# Patient Record
Sex: MALE | Race: WHITE | NOT HISPANIC OR LATINO | Employment: FULL TIME | URBAN - METROPOLITAN AREA
[De-identification: names, ages, dates, MRNs, and addresses within clinical notes are randomized per-mention and may not be internally consistent; named-entity substitution may affect disease eponyms.]

---

## 2019-09-09 ENCOUNTER — APPOINTMENT (OUTPATIENT)
Dept: GENERAL RADIOLOGY | Facility: HOSPITAL | Age: 34
End: 2019-09-09

## 2019-09-09 ENCOUNTER — HOSPITAL ENCOUNTER (EMERGENCY)
Facility: HOSPITAL | Age: 34
Discharge: HOME | End: 2019-09-09
Admitting: EMERGENCY MEDICINE

## 2019-09-09 VITALS
SYSTOLIC BLOOD PRESSURE: 128 MMHG | HEART RATE: 68 BPM | HEIGHT: 69 IN | OXYGEN SATURATION: 99 % | WEIGHT: 244.71 LBS | RESPIRATION RATE: 17 BRPM | TEMPERATURE: 97.5 F | BODY MASS INDEX: 36.24 KG/M2 | DIASTOLIC BLOOD PRESSURE: 79 MMHG

## 2019-09-09 DIAGNOSIS — M54.41 ACUTE RIGHT-SIDED LOW BACK PAIN WITH RIGHT-SIDED SCIATICA: Primary | ICD-10-CM

## 2019-09-09 PROCEDURE — 96372 THER/PROPH/DIAG INJ SC/IM: CPT

## 2019-09-09 PROCEDURE — 99283 EMERGENCY DEPT VISIT LOW MDM: CPT

## 2019-09-09 PROCEDURE — 25010000002 MORPHINE PER 10 MG: Performed by: NURSE PRACTITIONER

## 2019-09-09 PROCEDURE — 72110 X-RAY EXAM L-2 SPINE 4/>VWS: CPT

## 2019-09-09 RX ORDER — ONDANSETRON 4 MG/1
4 TABLET, ORALLY DISINTEGRATING ORAL ONCE
Status: COMPLETED | OUTPATIENT
Start: 2019-09-09 | End: 2019-09-09

## 2019-09-09 RX ORDER — HYDROCODONE BITARTRATE AND ACETAMINOPHEN 5; 325 MG/1; MG/1
1 TABLET ORAL ONCE AS NEEDED
Status: DISCONTINUED | OUTPATIENT
Start: 2019-09-09 | End: 2019-09-09 | Stop reason: HOSPADM

## 2019-09-09 RX ORDER — DICLOFENAC SODIUM 75 MG/1
75 TABLET, DELAYED RELEASE ORAL 2 TIMES DAILY PRN
Qty: 20 TABLET | Refills: 0 | Status: SHIPPED | OUTPATIENT
Start: 2019-09-09 | End: 2019-09-13 | Stop reason: HOSPADM

## 2019-09-09 RX ORDER — METHOCARBAMOL 500 MG/1
500 TABLET, FILM COATED ORAL ONCE
Status: COMPLETED | OUTPATIENT
Start: 2019-09-09 | End: 2019-09-09

## 2019-09-09 RX ORDER — MORPHINE SULFATE 4 MG/ML
4 INJECTION, SOLUTION INTRAMUSCULAR; INTRAVENOUS ONCE
Status: COMPLETED | OUTPATIENT
Start: 2019-09-09 | End: 2019-09-09

## 2019-09-09 RX ADMIN — HYDROCODONE BITARTRATE AND ACETAMINOPHEN 1 TABLET: 5; 325 TABLET ORAL at 07:49

## 2019-09-09 RX ADMIN — MORPHINE SULFATE 4 MG: 4 INJECTION INTRAVENOUS at 06:58

## 2019-09-09 RX ADMIN — ONDANSETRON 4 MG: 4 TABLET, ORALLY DISINTEGRATING ORAL at 06:59

## 2019-09-09 RX ADMIN — METHOCARBAMOL 500 MG: 500 TABLET ORAL at 06:58

## 2019-09-09 NOTE — ED PROVIDER NOTES
Subjective   Patient is a 34 y/o male complaining of back pain for one day. He reports waking up with right sided back pain yesterday morning that caused shooting pain down in right leg. He reports the pain is a constant dull pain with intermittent sharp shooting pains down the right side and leg. He verbalizes his pain is currently a 7/10, but is a 10/10 at its worst.  He verbalizes that he has had this back pain before, but it has never been this bad. He reports taking Tylenol and ibuprofen yesterday for his pain with no relief, he has taken nothing today.  He denies injury or trauma.  He denies any other pain at this time, no problems with urination or bowel movements. No numbness or tingling, no saddle anesthesia. No chest pain or shortness of breath. He reports going to the [facility] yesterday for this pain and received a steroid injection, which provided no relief, and a prescription for prednisone pack 10 mg and cyclobenzaprine 5 mg, which he states he has not filled yet. He has no pertinent medical history and reports no regular home medications.            Review of Systems   Constitutional: Negative for chills, fatigue and fever.   HENT: Negative for congestion, tinnitus and trouble swallowing.    Eyes: Negative for photophobia, discharge and redness.   Respiratory: Negative for cough and shortness of breath.    Cardiovascular: Negative for chest pain and palpitations.   Gastrointestinal: Negative for abdominal pain, diarrhea, nausea and vomiting.   Genitourinary: Negative for dysuria, frequency and urgency.   Musculoskeletal: Positive for back pain. Negative for joint swelling and myalgias.   Skin: Negative for rash.   Neurological: Negative for dizziness and headaches.   Psychiatric/Behavioral: Negative for confusion.   All other systems reviewed and are negative.      History reviewed. No pertinent past medical history.    No Known Allergies    History reviewed. No pertinent surgical history.    No  family history on file.    Social History     Socioeconomic History   • Marital status: Single     Spouse name: Not on file   • Number of children: Not on file   • Years of education: Not on file   • Highest education level: Not on file           Objective   Physical Exam   Constitutional: He is oriented to person, place, and time. He appears well-developed and well-nourished.   HENT:   Head: Normocephalic and atraumatic.   Eyes: Conjunctivae and EOM are normal. Pupils are equal, round, and reactive to light.   Neck: Normal range of motion. Neck supple.   Cardiovascular: Normal rate, regular rhythm, normal heart sounds and intact distal pulses.   Pulmonary/Chest: Effort normal and breath sounds normal.   Abdominal: Soft. Bowel sounds are normal.   Musculoskeletal: He exhibits tenderness. He exhibits no edema or deformity.        Lumbar back: He exhibits decreased range of motion, tenderness, pain and spasm. He exhibits no bony tenderness, no edema, no deformity, no laceration and normal pulse.        Back:    No overlying erythema, edema or infection noted.   Neurological: He is alert and oriented to person, place, and time. He has normal strength and normal reflexes. He displays a negative Romberg sign. GCS eye subscore is 4. GCS verbal subscore is 5. GCS motor subscore is 6.   Skin: Skin is warm and dry.   Psychiatric: He has a normal mood and affect. His speech is normal and behavior is normal. Judgment and thought content normal. Cognition and memory are normal.   Vitals reviewed.      Procedures           ED Course    Labs Reviewed - No data to display  Medications   HYDROcodone-acetaminophen (NORCO) 5-325 MG per tablet 1 tablet (1 tablet Oral Given 9/9/19 0749)   methocarbamol (ROBAXIN) tablet 500 mg (500 mg Oral Given 9/9/19 0658)   ondansetron ODT (ZOFRAN-ODT) disintegrating tablet 4 mg (4 mg Oral Given 9/9/19 0659)   Morphine sulfate (PF) injection 4 mg (4 mg Subcutaneous Given 9/9/19 0658)     Xr Spine  Lumbar 4+ View    Result Date: 9/9/2019  1.Mild degenerative disease at L5-S1. 2.No acute fracture or subluxation.  Electronically Signed By-Kennedy Dalton On:9/9/2019 7:24 AM This report was finalized on [id number] by  Kennedy Dalton, .        ED Course as of Sep 09 0755   Mon Sep 09, 2019   0729 On evaluation, patient reports a mild decrease in pain from 7/10 to 6/10, but still complains of constant dull pain. Will give Norco 5 mg prior to discharge.  [KW]      ED Course User Index  [KW] Marija Stephenson, APRN      Patient was treated with Robaxin Zofran and morphine here in the emergency room.  Lumbar x-ray done shows no acute fracture or abnormality.  Mild degenerative disc disease at L5 S1.  Patient still complained of pain after receiving morphine injection and was given Norco 5 mg on discharge.            MDM    Final diagnoses:   Acute right-sided low back pain with right-sided sciatica              Marija Stephenson, ANGIE  09/09/19 075

## 2019-09-09 NOTE — ED NOTES
Patient states he has had pain on the right side shooting down his back for 1 day. No known trauma or injury to back. Seen yesterday at a clinic and was given a steroid injection and prescription for PO steroids,     Luz Elena Mendosa RN  09/09/19 0682

## 2019-09-09 NOTE — DISCHARGE INSTRUCTIONS
Apply heat and static stretching to the area of discomfort.    Patient to fill and take prednisone and cyclobenzaprine that he was given yesterday at the [facility].  He may also take diclofenac for pain.  Do not take with Aleve Motrin ibuprofen.    Patient to follow up with PCP in 1-3 days for further evaluation. Dr. Crowley is on call .    May return for new or worsening symptoms, increased pain, fever, shortness of breath, or difficulty urinating.

## 2019-09-11 ENCOUNTER — HOSPITAL ENCOUNTER (OUTPATIENT)
Facility: HOSPITAL | Age: 34
Setting detail: OBSERVATION
Discharge: HOME | End: 2019-09-13
Attending: FAMILY MEDICINE | Admitting: FAMILY MEDICINE

## 2019-09-11 PROBLEM — M54.9 INTRACTABLE BACK PAIN: Status: ACTIVE | Noted: 2019-09-11

## 2019-09-11 LAB
ALBUMIN SERPL-MCNC: 3.5 G/DL (ref 3.5–4.8)
ALBUMIN/GLOB SERPL: 1.3 G/DL (ref 1–1.7)
ALP SERPL-CCNC: 45 U/L (ref 32–91)
ALT SERPL W P-5'-P-CCNC: 67 U/L (ref 17–63)
ANION GAP SERPL CALCULATED.3IONS-SCNC: 16.2 MMOL/L (ref 5–15)
AST SERPL-CCNC: 47 U/L (ref 15–41)
BASOPHILS # BLD AUTO: 0 10*3/MM3 (ref 0–0.2)
BASOPHILS NFR BLD AUTO: 0.4 % (ref 0–1.5)
BILIRUB SERPL-MCNC: 0.6 MG/DL (ref 0.3–1.2)
BUN BLD-MCNC: 10 MG/DL (ref 8–20)
BUN/CREAT SERPL: 11.1 (ref 6.2–20.3)
CALCIUM SPEC-SCNC: 8.8 MG/DL (ref 8.9–10.3)
CHLORIDE SERPL-SCNC: 101 MMOL/L (ref 101–111)
CO2 SERPL-SCNC: 26 MMOL/L (ref 22–32)
CREAT BLD-MCNC: 0.9 MG/DL (ref 0.7–1.2)
DEPRECATED RDW RBC AUTO: 44.2 FL (ref 37–54)
EOSINOPHIL # BLD AUTO: 0 10*3/MM3 (ref 0–0.4)
EOSINOPHIL NFR BLD AUTO: 0.2 % (ref 0.3–6.2)
ERYTHROCYTE [DISTWIDTH] IN BLOOD BY AUTOMATED COUNT: 13.7 % (ref 12.3–15.4)
GFR SERPL CREATININE-BSD FRML MDRD: 98 ML/MIN/1.73
GLOBULIN UR ELPH-MCNC: 2.6 GM/DL (ref 2.5–3.8)
GLUCOSE BLD-MCNC: 154 MG/DL (ref 65–99)
HCT VFR BLD AUTO: 44.2 % (ref 37.5–51)
HGB BLD-MCNC: 14.8 G/DL (ref 13–17.7)
LYMPHOCYTES # BLD AUTO: 2.1 10*3/MM3 (ref 0.7–3.1)
LYMPHOCYTES NFR BLD AUTO: 19.9 % (ref 19.6–45.3)
MCH RBC QN AUTO: 30.9 PG (ref 26.6–33)
MCHC RBC AUTO-ENTMCNC: 33.5 G/DL (ref 31.5–35.7)
MCV RBC AUTO: 92.2 FL (ref 79–97)
MONOCYTES # BLD AUTO: 0.4 10*3/MM3 (ref 0.1–0.9)
MONOCYTES NFR BLD AUTO: 4 % (ref 5–12)
NEUTROPHILS # BLD AUTO: 8 10*3/MM3 (ref 1.7–7)
NEUTROPHILS NFR BLD AUTO: 75.5 % (ref 42.7–76)
NRBC BLD AUTO-RTO: 0.1 /100 WBC (ref 0–0.2)
PLATELET # BLD AUTO: 223 10*3/MM3 (ref 140–450)
PMV BLD AUTO: 9.6 FL (ref 6–12)
POTASSIUM BLD-SCNC: 3.2 MMOL/L (ref 3.6–5.1)
PROT SERPL-MCNC: 6.1 G/DL (ref 6.1–7.9)
RBC # BLD AUTO: 4.79 10*6/MM3 (ref 4.14–5.8)
SODIUM BLD-SCNC: 140 MMOL/L (ref 136–144)
WBC NRBC COR # BLD: 10.6 10*3/MM3 (ref 3.4–10.8)

## 2019-09-11 PROCEDURE — 85025 COMPLETE CBC W/AUTO DIFF WBC: CPT | Performed by: FAMILY MEDICINE

## 2019-09-11 PROCEDURE — 25010000002 KETOROLAC TROMETHAMINE PER 15 MG: Performed by: FAMILY MEDICINE

## 2019-09-11 PROCEDURE — 96374 THER/PROPH/DIAG INJ IV PUSH: CPT

## 2019-09-11 PROCEDURE — 25010000002 METHYLPREDNISOLONE PER 125 MG: Performed by: FAMILY MEDICINE

## 2019-09-11 PROCEDURE — G0379 DIRECT REFER HOSPITAL OBSERV: HCPCS

## 2019-09-11 PROCEDURE — G0378 HOSPITAL OBSERVATION PER HR: HCPCS

## 2019-09-11 PROCEDURE — 25010000002 MORPHINE PER 10 MG: Performed by: FAMILY MEDICINE

## 2019-09-11 PROCEDURE — 80053 COMPREHEN METABOLIC PANEL: CPT | Performed by: FAMILY MEDICINE

## 2019-09-11 PROCEDURE — 96375 TX/PRO/DX INJ NEW DRUG ADDON: CPT

## 2019-09-11 RX ORDER — METHYLPREDNISOLONE SODIUM SUCCINATE 125 MG/2ML
80 INJECTION, POWDER, LYOPHILIZED, FOR SOLUTION INTRAMUSCULAR; INTRAVENOUS EVERY 8 HOURS
Status: DISCONTINUED | OUTPATIENT
Start: 2019-09-11 | End: 2019-09-13 | Stop reason: HOSPADM

## 2019-09-11 RX ORDER — KETOROLAC TROMETHAMINE 30 MG/ML
30 INJECTION, SOLUTION INTRAMUSCULAR; INTRAVENOUS EVERY 6 HOURS PRN
Status: DISCONTINUED | OUTPATIENT
Start: 2019-09-11 | End: 2019-09-13 | Stop reason: HOSPADM

## 2019-09-11 RX ORDER — HYDROCODONE BITARTRATE AND ACETAMINOPHEN 10; 325 MG/1; MG/1
1 TABLET ORAL EVERY 4 HOURS PRN
Status: DISCONTINUED | OUTPATIENT
Start: 2019-09-11 | End: 2019-09-13 | Stop reason: HOSPADM

## 2019-09-11 RX ORDER — MORPHINE SULFATE 4 MG/ML
4 INJECTION, SOLUTION INTRAMUSCULAR; INTRAVENOUS
Status: DISCONTINUED | OUTPATIENT
Start: 2019-09-11 | End: 2019-09-13 | Stop reason: HOSPADM

## 2019-09-11 RX ORDER — POTASSIUM CHLORIDE 20 MEQ/1
20 TABLET, EXTENDED RELEASE ORAL ONCE
Status: COMPLETED | OUTPATIENT
Start: 2019-09-11 | End: 2019-09-11

## 2019-09-11 RX ORDER — AMLODIPINE BESYLATE 5 MG/1
5 TABLET ORAL EVERY 12 HOURS
Status: DISCONTINUED | OUTPATIENT
Start: 2019-09-12 | End: 2019-09-13 | Stop reason: HOSPADM

## 2019-09-11 RX ORDER — AMLODIPINE BESYLATE 5 MG/1
5 TABLET ORAL ONCE
Status: COMPLETED | OUTPATIENT
Start: 2019-09-12 | End: 2019-09-11

## 2019-09-11 RX ORDER — ACETAMINOPHEN 325 MG/1
650 TABLET ORAL EVERY 6 HOURS PRN
Status: DISCONTINUED | OUTPATIENT
Start: 2019-09-11 | End: 2019-09-13 | Stop reason: HOSPADM

## 2019-09-11 RX ORDER — CYCLOBENZAPRINE HCL 10 MG
10 TABLET ORAL 3 TIMES DAILY PRN
COMMUNITY
End: 2023-01-20

## 2019-09-11 RX ADMIN — KETOROLAC TROMETHAMINE 30 MG: 30 INJECTION, SOLUTION INTRAMUSCULAR at 23:19

## 2019-09-11 RX ADMIN — METHYLPREDNISOLONE SODIUM SUCCINATE 80 MG: 125 INJECTION, POWDER, FOR SOLUTION INTRAMUSCULAR; INTRAVENOUS at 21:56

## 2019-09-11 RX ADMIN — MORPHINE SULFATE 4 MG: 4 INJECTION INTRAVENOUS at 21:56

## 2019-09-11 RX ADMIN — POTASSIUM CHLORIDE 20 MEQ: 1500 TABLET, EXTENDED RELEASE ORAL at 22:40

## 2019-09-11 RX ADMIN — AMLODIPINE BESYLATE 5 MG: 5 TABLET ORAL at 23:19

## 2019-09-11 RX ADMIN — HYDROCODONE BITARTRATE AND ACETAMINOPHEN 1 TABLET: 10; 325 TABLET ORAL at 23:19

## 2019-09-12 ENCOUNTER — APPOINTMENT (OUTPATIENT)
Dept: MRI IMAGING | Facility: HOSPITAL | Age: 34
End: 2019-09-12

## 2019-09-12 PROBLEM — M51.27 HERNIATION OF INTERVERTEBRAL DISC BETWEEN L5 AND S1: Status: ACTIVE | Noted: 2019-09-12

## 2019-09-12 PROBLEM — T38.0X5A STEROID-INDUCED HYPERGLYCEMIA: Status: ACTIVE | Noted: 2019-09-12

## 2019-09-12 PROBLEM — R73.9 STEROID-INDUCED HYPERGLYCEMIA: Status: ACTIVE | Noted: 2019-09-12

## 2019-09-12 LAB
ALBUMIN SERPL-MCNC: 3.8 G/DL (ref 3.5–4.8)
ALBUMIN/GLOB SERPL: 1.7 G/DL (ref 1–1.7)
ALP SERPL-CCNC: 50 U/L (ref 32–91)
ALT SERPL W P-5'-P-CCNC: 81 U/L (ref 17–63)
ANION GAP SERPL CALCULATED.3IONS-SCNC: 13.6 MMOL/L (ref 5–15)
AST SERPL-CCNC: 52 U/L (ref 15–41)
BASOPHILS # BLD AUTO: 0 10*3/MM3 (ref 0–0.2)
BASOPHILS NFR BLD AUTO: 0.2 % (ref 0–1.5)
BILIRUB SERPL-MCNC: 0.5 MG/DL (ref 0.3–1.2)
BUN BLD-MCNC: 11 MG/DL (ref 8–20)
BUN/CREAT SERPL: 13.8 (ref 6.2–20.3)
CALCIUM SPEC-SCNC: 9.3 MG/DL (ref 8.9–10.3)
CHLORIDE SERPL-SCNC: 103 MMOL/L (ref 101–111)
CO2 SERPL-SCNC: 28 MMOL/L (ref 22–32)
CREAT BLD-MCNC: 0.8 MG/DL (ref 0.7–1.2)
DEPRECATED RDW RBC AUTO: 45.1 FL (ref 37–54)
EOSINOPHIL # BLD AUTO: 0 10*3/MM3 (ref 0–0.4)
EOSINOPHIL NFR BLD AUTO: 0 % (ref 0.3–6.2)
ERYTHROCYTE [DISTWIDTH] IN BLOOD BY AUTOMATED COUNT: 13.7 % (ref 12.3–15.4)
GFR SERPL CREATININE-BSD FRML MDRD: 112 ML/MIN/1.73
GLOBULIN UR ELPH-MCNC: 2.3 GM/DL (ref 2.5–3.8)
GLUCOSE BLD-MCNC: 127 MG/DL (ref 65–99)
GLUCOSE BLDC GLUCOMTR-MCNC: 194 MG/DL (ref 70–105)
HBA1C MFR BLD: 5.2 % (ref 3.5–5.6)
HCT VFR BLD AUTO: 45.2 % (ref 37.5–51)
HGB BLD-MCNC: 15 G/DL (ref 13–17.7)
LYMPHOCYTES # BLD AUTO: 1.2 10*3/MM3 (ref 0.7–3.1)
LYMPHOCYTES NFR BLD AUTO: 11.2 % (ref 19.6–45.3)
MCH RBC QN AUTO: 30.9 PG (ref 26.6–33)
MCHC RBC AUTO-ENTMCNC: 33.2 G/DL (ref 31.5–35.7)
MCV RBC AUTO: 92.9 FL (ref 79–97)
MONOCYTES # BLD AUTO: 0.3 10*3/MM3 (ref 0.1–0.9)
MONOCYTES NFR BLD AUTO: 2.6 % (ref 5–12)
NEUTROPHILS # BLD AUTO: 9.5 10*3/MM3 (ref 1.7–7)
NEUTROPHILS NFR BLD AUTO: 86 % (ref 42.7–76)
NRBC BLD AUTO-RTO: 0.1 /100 WBC (ref 0–0.2)
PLATELET # BLD AUTO: 236 10*3/MM3 (ref 140–450)
PMV BLD AUTO: 9.7 FL (ref 6–12)
POTASSIUM BLD-SCNC: 4.6 MMOL/L (ref 3.6–5.1)
PROT SERPL-MCNC: 6.1 G/DL (ref 6.1–7.9)
RBC # BLD AUTO: 4.87 10*6/MM3 (ref 4.14–5.8)
SODIUM BLD-SCNC: 140 MMOL/L (ref 136–144)
WBC NRBC COR # BLD: 11.1 10*3/MM3 (ref 3.4–10.8)

## 2019-09-12 PROCEDURE — 85025 COMPLETE CBC W/AUTO DIFF WBC: CPT | Performed by: FAMILY MEDICINE

## 2019-09-12 PROCEDURE — 83036 HEMOGLOBIN GLYCOSYLATED A1C: CPT | Performed by: FAMILY MEDICINE

## 2019-09-12 PROCEDURE — 80053 COMPREHEN METABOLIC PANEL: CPT | Performed by: FAMILY MEDICINE

## 2019-09-12 PROCEDURE — G0378 HOSPITAL OBSERVATION PER HR: HCPCS

## 2019-09-12 PROCEDURE — 96376 TX/PRO/DX INJ SAME DRUG ADON: CPT

## 2019-09-12 PROCEDURE — 25010000002 ENOXAPARIN PER 10 MG: Performed by: FAMILY MEDICINE

## 2019-09-12 PROCEDURE — 25010000002 KETOROLAC TROMETHAMINE PER 15 MG: Performed by: FAMILY MEDICINE

## 2019-09-12 PROCEDURE — 25010000002 MORPHINE PER 10 MG: Performed by: FAMILY MEDICINE

## 2019-09-12 PROCEDURE — 96372 THER/PROPH/DIAG INJ SC/IM: CPT

## 2019-09-12 PROCEDURE — 97161 PT EVAL LOW COMPLEX 20 MIN: CPT

## 2019-09-12 PROCEDURE — 82962 GLUCOSE BLOOD TEST: CPT

## 2019-09-12 PROCEDURE — 25010000002 METHYLPREDNISOLONE PER 125 MG: Performed by: FAMILY MEDICINE

## 2019-09-12 PROCEDURE — 72148 MRI LUMBAR SPINE W/O DYE: CPT

## 2019-09-12 RX ORDER — ONDANSETRON 4 MG/1
4 TABLET, FILM COATED ORAL EVERY 4 HOURS PRN
Status: DISCONTINUED | OUTPATIENT
Start: 2019-09-12 | End: 2019-09-13 | Stop reason: HOSPADM

## 2019-09-12 RX ORDER — ALUMINA, MAGNESIA, AND SIMETHICONE 2400; 2400; 240 MG/30ML; MG/30ML; MG/30ML
15 SUSPENSION ORAL EVERY 6 HOURS PRN
Status: DISCONTINUED | OUTPATIENT
Start: 2019-09-12 | End: 2019-09-13 | Stop reason: HOSPADM

## 2019-09-12 RX ORDER — ONDANSETRON 2 MG/ML
4 INJECTION INTRAMUSCULAR; INTRAVENOUS EVERY 4 HOURS PRN
Status: DISCONTINUED | OUTPATIENT
Start: 2019-09-12 | End: 2019-09-13 | Stop reason: HOSPADM

## 2019-09-12 RX ORDER — BISACODYL 10 MG
10 SUPPOSITORY, RECTAL RECTAL DAILY PRN
Status: DISCONTINUED | OUTPATIENT
Start: 2019-09-12 | End: 2019-09-13 | Stop reason: HOSPADM

## 2019-09-12 RX ORDER — SODIUM CHLORIDE 0.9 % (FLUSH) 0.9 %
10 SYRINGE (ML) INJECTION EVERY 12 HOURS SCHEDULED
Status: DISCONTINUED | OUTPATIENT
Start: 2019-09-12 | End: 2019-09-13 | Stop reason: HOSPADM

## 2019-09-12 RX ORDER — DOCUSATE SODIUM 100 MG/1
100 CAPSULE, LIQUID FILLED ORAL DAILY
Status: DISCONTINUED | OUTPATIENT
Start: 2019-09-13 | End: 2019-09-13 | Stop reason: HOSPADM

## 2019-09-12 RX ORDER — SODIUM CHLORIDE 0.9 % (FLUSH) 0.9 %
10 SYRINGE (ML) INJECTION AS NEEDED
Status: DISCONTINUED | OUTPATIENT
Start: 2019-09-12 | End: 2019-09-13 | Stop reason: HOSPADM

## 2019-09-12 RX ADMIN — KETOROLAC TROMETHAMINE 30 MG: 30 INJECTION, SOLUTION INTRAMUSCULAR at 19:31

## 2019-09-12 RX ADMIN — ENOXAPARIN SODIUM 40 MG: 40 INJECTION SUBCUTANEOUS at 22:32

## 2019-09-12 RX ADMIN — METHYLPREDNISOLONE SODIUM SUCCINATE 80 MG: 125 INJECTION, POWDER, FOR SOLUTION INTRAMUSCULAR; INTRAVENOUS at 05:16

## 2019-09-12 RX ADMIN — HYDROCODONE BITARTRATE AND ACETAMINOPHEN 1 TABLET: 10; 325 TABLET ORAL at 19:31

## 2019-09-12 RX ADMIN — METHYLPREDNISOLONE SODIUM SUCCINATE 80 MG: 125 INJECTION, POWDER, FOR SOLUTION INTRAMUSCULAR; INTRAVENOUS at 22:31

## 2019-09-12 RX ADMIN — KETOROLAC TROMETHAMINE 30 MG: 30 INJECTION, SOLUTION INTRAMUSCULAR at 11:16

## 2019-09-12 RX ADMIN — MORPHINE SULFATE 4 MG: 4 INJECTION INTRAVENOUS at 05:16

## 2019-09-12 RX ADMIN — HYDROCODONE BITARTRATE AND ACETAMINOPHEN 1 TABLET: 10; 325 TABLET ORAL at 10:04

## 2019-09-12 RX ADMIN — Medication 10 ML: at 21:44

## 2019-09-12 RX ADMIN — MORPHINE SULFATE 4 MG: 4 INJECTION INTRAVENOUS at 22:31

## 2019-09-12 RX ADMIN — METHYLPREDNISOLONE SODIUM SUCCINATE 80 MG: 125 INJECTION, POWDER, FOR SOLUTION INTRAMUSCULAR; INTRAVENOUS at 13:16

## 2019-09-12 NOTE — PLAN OF CARE
Problem: Patient Care Overview  Goal: Plan of Care Review  Outcome: Outcome(s) achieved Date Met: 09/12/19 09/12/19 0911   Coping/Psychosocial   Plan of Care Reviewed With patient   Plan of Care Review   Progress improving   OTHER   Outcome Summary Pt presents with complaints of right low back pain with RLE radicular signs/symptoms of progressive worsening over the last several days. Pt reports previous hx of similar episodes that resolved within 1-2 days. Pt c/o pain 5/10 this date but demonstrates MOD I with all mobility with no AD. Pt demonstrates antalgic gait with decreased stance RLE and with reports of RLE numbness/tingling distal to knee, however, with no LOB during mobility and anticipate safe return home with assist from fiance as needed. No skilled acute care physical therapy needs at this time, however, recommending OP PT for treatment of low back pain/radicular signs/symptoms. Pt will be d/c'd from PT services at [facility].

## 2019-09-12 NOTE — THERAPY EVALUATION
Patient Name: Bo Kay  : 1985    MRN: [id number]                              Today's Date: 2019       Admit Date: 2019    Visit Dx: No diagnosis found.  Patient Active Problem List   Diagnosis   • Intractable back pain     No past medical history on file.  No past surgical history on file.  General Information     Row Name 19 0843          PT Evaluation Time/Intention    Document Type  evaluation  -AO     Mode of Treatment  physical therapy  -AO     Row Name 19 0843          General Information    Patient Profile Reviewed?  yes  -AO     Prior Level of Function  independent: Working full-time in construction; independent all ADLs and driving, etc.  -AO     Existing Precautions/Restrictions  no known precautions/restrictions  -AO     Barriers to Rehab  none identified  -AO     Row Name 19 0843          Relationship/Environment    Lives With  -- Lives w/ fiance who is home during the day and can provide assist as needed  -AO     Row Name 19 0843          Resource/Environmental Concerns    Current Living Arrangements  home/apartment/condo  -AO     Row Name 19 0843          Home Main Entrance    Number of Stairs, Main Entrance  four  -AO     Row Name 19 0843          Stairs Within Home, Primary    Number of Stairs, Within Home, Primary  none  -AO     Row Name 19 0843          Cognitive Assessment/Intervention- PT/OT    Orientation Status (Cognition)  oriented x 4  -AO       User Key  (r) = Recorded By, (t) = Taken By, (c) = Cosigned By    Initials Name Provider Type    AO Christina Ryan, PT Physical Therapist        Mobility     Row Name 19 0843          Bed Mobility Assessment/Treatment    Bed Mobility Assessment/Treatment  supine-sit-supine  -AO     Supine-Sit-Supine [place] (Bed Mobility)  conditional independence  -AO     Row Name 19 0843          Sit-Stand Transfer    Sit-Stand [place] (Transfers)  conditional independence   -AO     Row Name 09/12/19 0843          Gait/Stairs Assessment/Training    Gait/Stairs Assessment/Training  distance ambulated  -AO     [place] Level (Gait)  conditional independence  -AO     Distance in Feet (Gait)  175 ft  -AO     Pattern (Gait)  other (see comments) reciprocal  -AO     Deviations/Abnormal Patterns (Gait)  right sided deviations;antalgic  -AO     Right Sided Gait Deviations  heel strike decreased decreased stance time  -AO       User Key  (r) = Recorded By, (t) = Taken By, (c) = Cosigned By    Initials Name Provider Type    AO Christina Ryan, PT Physical Therapist        Obj/Interventions     Row Name 09/12/19 0843          General ROM    GENERAL ROM COMMENTS  BUE/BLE AROM WFL  -AO     Row Name 09/12/19 0843          MMT (Manual Muscle Testing)    General MMT Comments  BUE/BLEs WFL  -AO     Row Name 09/12/19 0843          Static Sitting Balance    Level of [place] (Unsupported Sitting, Static Balance)  independent  -AO     Time Able to Maintain Position (Unsupported Sitting, Static Balance)  1 to 2 minutes  -AO     Comment (Unsupported Sitting, Static Balance)  Static sitting tolerance limited by increased low back and RLE pain in sitting  -AO     Row Name 09/12/19 0843          Static Standing Balance    Level of [place] (Supported Standing, Static Balance)  independent  -AO     Time Able to Maintain Position (Supported Standing, Static Balance)  1 to 2 minutes  -AO     Row Name 09/12/19 0843          Dynamic Standing Balance    Level of [place], Reaches Outside Midline (Standing, Dynamic Balance)  conditional independence  -AO     Comment, Reaches Outside Midline (Standing, Dynamic Balance)  Able to shanae R sock in standing (RLE propped on chair) but required assist for donning LLE secondary to increased R hip/LE pain with weight shifting onto RLE  -AO     Row Name 09/12/19 0843          Sensory Assessment/Intervention    Sensory General Assessment  -- RLE radiular  "signs/symptoms with impaired sensation distal to R knee  -AO       User Key  (r) = Recorded By, (t) = Taken By, (c) = Cosigned By    Initials Name Provider Type    Christina Olmstead, PT Physical Therapist        Goals/Plan     Row Name 09/12/19 0843          Bed Mobility Goal 1 (PT)    Activity/Assistive Device (Bed Mobility Goal 1, PT)  sit to supine/supine to sit  -AO     [place] Level/Cues Needed (Bed Mobility Goal 1, PT)  conditional independence  -AO     Time Frame (Bed Mobility Goal 1, PT)  2 weeks  -AO     Progress/Outcomes (Bed Mobility Goal 1, PT)  goal met  -AO       User Key  (r) = Recorded By, (t) = Taken By, (c) = Cosigned By    Initials Name Provider Type    Christina Olmstead, PT Physical Therapist        Clinical Impression     Row Name 09/12/19 0843          Pain Assessment    Additional Documentation  Pain Scale: Numbers Pre/Post-Treatment (Group)  -AO     Row Name 09/12/19 0843          Pain Scale: Numbers Pre/Post-Treatment    Pain Scale: Numbers, Pretreatment  5/10  -AO     Pain Scale: Numbers, Post-Treatment  5/10  -AO     Pain Location - Side  Right  -AO     Pain Location - Orientation  lower  -AO     Pain Location  extremity  -AO     Pre/Post Treatment Pain Comment  Pt reports this AM his was having low back/hip spasms with significant radicular symptoms  -AO     Pain Intervention(s)  Medication (See MAR)  -AO     Row Name 09/12/19 0843          Plan of Care Review    Plan of Care Reviewed With  patient  -AO     Row Name 09/12/19 0843          Physical Therapy Clinical Impression    Patient/Family Goals Statement (PT Clinical Impression)  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 Pt presented to [facility] with intractable LBP with RLE radicular signs/symptoms that began ~2 weeks ago but became progessively worse/intolerable Saturday, 09/07/19. MRI results pending. Pt reports previous history of similar LBP and radicular signs and symptoms that \"comes and " "goes\" from time to time, with most recent episode last June, however, pt reports previous episodes lasted only 1-2 days before symptoms would resolve. Pt describes pain as beginning in R low back, down hip/buttocks, and into the R lower extremity.  -AO     Criteria for Skilled Interventions Met (PT Clinical Impression)  other (see comments) Pt with no skilled acute physical therapy needs, however, recommending OP PT for treatment of LBP  -AO     Row Name 09/12/19 0843          Vital Signs    Recovery Time  Vitals WNL on RA.  -AO       User Key  (r) = Recorded By, (t) = Taken By, (c) = Cosigned By    Initials Name Provider Type    AO Christina Ryan, PT Physical Therapist        Outcome Measures    No documentation.       Physical Therapy Education     Title: PT OT SLP Therapies (Done)     Topic: Physical Therapy (Done)     Point: Mobility training (Done)     Learning Progress Summary           Patient Acceptance, E,TB, VU by AO at 9/12/2019  9:09 AM    Comment:  Educated pt on RLE piriformis/low back stretch for improved radicular signs/symptoms. Educated pt on benefits of OP PT for spine treatment and low back pain. Educated pt on POC.                   Point: Body mechanics (Done)     Learning Progress Summary           Patient Acceptance, E,TB, VU by AO at 9/12/2019  9:09 AM    Comment:  Educated pt on RLE piriformis/low back stretch for improved radicular signs/symptoms. Educated pt on benefits of OP PT for spine treatment and low back pain. Educated pt on POC.                   Point: Precautions (Done)     Learning Progress Summary           Patient Acceptance, E,TB, VU by AO at 9/12/2019  9:09 AM    Comment:  Educated pt on RLE piriformis/low back stretch for improved radicular signs/symptoms. Educated pt on benefits of OP PT for spine treatment and low back pain. Educated pt on POC.                               User Key     Initials Effective Dates Name Provider Type Discipline    AO 03/01/19 -  [place]helby" Christina TORRES, PT Physical Therapist PT              PT Recommendation and Plan  Planned Therapy Interventions (PT Eval): balance training, bed mobility training, gait training, patient/family education, transfer training  Outcome Summary/Treatment Plan (PT)  Anticipated Discharge Disposition (PT): home with OP services(assist from fiance as needed)  Plan of Care Reviewed With: patient  Progress: improving  Outcome Summary: Pt presents with complaints of right low back pain with RLE radicular signs/symptoms of progressive worsening over the last several days. Pt reports previous hx of similar episodes that resolved within 1-2 days. Pt c/o pain 5/10 this date but demonstrates MOD I with all mobility with no AD. Pt demonstrates antalgic gait with decreased stance RLE and with reports of RLE numbness/tingling distal to knee, however, with no LOB during mobility and anticipate safe return home with assist from fiance as needed. No skilled acute care physical therapy needs at this time, however, recommending OP PT for treatment of low back pain/radicular signs/symptoms. Pt will be d/c'd [facility] at [facility].     Time Calculation:   PT Charges     Row Name 09/12/19 0843             Time Calculation    Start Time  0843  -AO      Stop Time  0857  -AO      Time Calculation (min)  14 min  -AO      PT Received On  09/12/19  -AO         Time Calculation- PT    Total Timed Code Minutes- PT  0 minute(s)  -AO      TCU Minutes- PT  19 min  -AO        User Key  (r) = Recorded By, (t) = Taken By, (c) = Cosigned By    Initials Name Provider Type    AO Christina Ryan, PT Physical Therapist        Therapy Charges for Today     Code Description Service Date Service Provider Modifiers Qty    77012265628  PT EVAL LOW COMPLEXITY 4 9/12/2019 Christina Ryan, PT GP 1               Christina Ryan PT  9/12/2019

## 2019-09-12 NOTE — PLAN OF CARE
Problem: Patient Care Overview  Goal: Plan of Care Review  Outcome: Ongoing (interventions implemented as appropriate)   09/12/19 0330   Coping/Psychosocial   Plan of Care Reviewed With patient;significant other   Plan of Care Review   Progress improving   OTHER   Outcome Summary Better pain control through the night, awaiting MRI scan     Goal: Individualization and Mutuality  Outcome: Ongoing (interventions implemented as appropriate)   09/12/19 0330   Individualization   Patient Specific Goals (Include Timeframe) pain under control by discharge   Patient Specific Interventions Pain medications as ordered, MRI lumbar spine     Goal: Discharge Needs Assessment  Outcome: Ongoing (interventions implemented as appropriate)   09/12/19 0330   Discharge Needs Assessment   Readmission Within the Last 30 Days no previous admission in last 30 days   Concerns to be Addressed denies needs/concerns at this time   Patient/Family Anticipates Transition to home with family   Patient/Family Anticipated Services at Transition none   Transportation Concerns car, none   Transportation Anticipated family or friend will provide   Anticipated Changes Related to Illness none   Equipment Needed After Discharge none   Disability   Equipment Currently Used at Home none       Problem: Pain, Acute (Adult)  Goal: Identify Related Risk Factors and Signs and Symptoms  Outcome: Outcome(s) achieved Date Met: 09/12/19 09/12/19 0330   Pain, Acute (Adult)   Related Risk Factors (Acute Pain) disease process;positioning;persistent pain   Signs and Symptoms (Acute Pain) facial mask of pain/grimace;guarding/abnormal posturing/positioning;moaning;verbalization of pain descriptors;sleep pattern alteration     Goal: Acceptable Pain Control/Comfort Level  Outcome: Ongoing (interventions implemented as appropriate)   09/12/19 0330   Pain, Acute (Adult)   Acceptable Pain Control/Comfort Level making progress toward outcome

## 2019-09-13 VITALS
WEIGHT: 249.12 LBS | HEIGHT: 69 IN | TEMPERATURE: 97.5 F | HEART RATE: 53 BPM | SYSTOLIC BLOOD PRESSURE: 155 MMHG | RESPIRATION RATE: 12 BRPM | BODY MASS INDEX: 36.9 KG/M2 | DIASTOLIC BLOOD PRESSURE: 82 MMHG | OXYGEN SATURATION: 97 %

## 2019-09-13 LAB
ALBUMIN SERPL-MCNC: 3.3 G/DL (ref 3.5–4.8)
ALBUMIN/GLOB SERPL: 1.4 G/DL (ref 1–1.7)
ALP SERPL-CCNC: 48 U/L (ref 32–91)
ALT SERPL W P-5'-P-CCNC: 63 U/L (ref 17–63)
ANION GAP SERPL CALCULATED.3IONS-SCNC: 12.2 MMOL/L (ref 5–15)
AST SERPL-CCNC: 29 U/L (ref 15–41)
BILIRUB SERPL-MCNC: 0.5 MG/DL (ref 0.3–1.2)
BUN BLD-MCNC: 17 MG/DL (ref 8–20)
BUN/CREAT SERPL: 18.9 (ref 6.2–20.3)
CALCIUM SPEC-SCNC: 9 MG/DL (ref 8.9–10.3)
CHLORIDE SERPL-SCNC: 101 MMOL/L (ref 101–111)
CO2 SERPL-SCNC: 29 MMOL/L (ref 22–32)
CREAT BLD-MCNC: 0.9 MG/DL (ref 0.7–1.2)
DEPRECATED RDW RBC AUTO: 44.2 FL (ref 37–54)
ERYTHROCYTE [DISTWIDTH] IN BLOOD BY AUTOMATED COUNT: 13.8 % (ref 12.3–15.4)
GFR SERPL CREATININE-BSD FRML MDRD: 98 ML/MIN/1.73
GIANT PLATELETS: ABNORMAL
GLOBULIN UR ELPH-MCNC: 2.4 GM/DL (ref 2.5–3.8)
GLUCOSE BLD-MCNC: 164 MG/DL (ref 65–99)
HCT VFR BLD AUTO: 45.2 % (ref 37.5–51)
HGB BLD-MCNC: 15.1 G/DL (ref 13–17.7)
LYMPHOCYTES # BLD MANUAL: 0.64 10*3/MM3 (ref 0.7–3.1)
LYMPHOCYTES NFR BLD MANUAL: 3 % (ref 5–12)
LYMPHOCYTES NFR BLD MANUAL: 4 % (ref 19.6–45.3)
MAGNESIUM SERPL-MCNC: 2.3 MG/DL (ref 1.8–2.5)
MCH RBC QN AUTO: 30.8 PG (ref 26.6–33)
MCHC RBC AUTO-ENTMCNC: 33.5 G/DL (ref 31.5–35.7)
MCV RBC AUTO: 92 FL (ref 79–97)
MONOCYTES # BLD AUTO: 0.48 10*3/MM3 (ref 0.1–0.9)
NEUTROPHILS # BLD AUTO: 14.79 10*3/MM3 (ref 1.7–7)
NEUTROPHILS NFR BLD MANUAL: 84 % (ref 42.7–76)
NEUTS BAND NFR BLD MANUAL: 9 % (ref 0–5)
PLATELET # BLD AUTO: 258 10*3/MM3 (ref 140–450)
PMV BLD AUTO: 9 FL (ref 6–12)
POTASSIUM BLD-SCNC: 4.2 MMOL/L (ref 3.6–5.1)
PROT SERPL-MCNC: 5.7 G/DL (ref 6.1–7.9)
RBC # BLD AUTO: 4.91 10*6/MM3 (ref 4.14–5.8)
RBC MORPH BLD: NORMAL
SCAN SLIDE: NORMAL
SODIUM BLD-SCNC: 138 MMOL/L (ref 136–144)
TOXIC GRANULATION: ABNORMAL
WBC NRBC COR # BLD: 15.9 10*3/MM3 (ref 3.4–10.8)

## 2019-09-13 PROCEDURE — G0378 HOSPITAL OBSERVATION PER HR: HCPCS

## 2019-09-13 PROCEDURE — 25010000002 KETOROLAC TROMETHAMINE PER 15 MG: Performed by: FAMILY MEDICINE

## 2019-09-13 PROCEDURE — 85007 BL SMEAR W/DIFF WBC COUNT: CPT | Performed by: FAMILY MEDICINE

## 2019-09-13 PROCEDURE — 96376 TX/PRO/DX INJ SAME DRUG ADON: CPT

## 2019-09-13 PROCEDURE — 83735 ASSAY OF MAGNESIUM: CPT | Performed by: FAMILY MEDICINE

## 2019-09-13 PROCEDURE — 25010000002 METHYLPREDNISOLONE PER 125 MG: Performed by: FAMILY MEDICINE

## 2019-09-13 PROCEDURE — 80053 COMPREHEN METABOLIC PANEL: CPT | Performed by: FAMILY MEDICINE

## 2019-09-13 PROCEDURE — 85025 COMPLETE CBC W/AUTO DIFF WBC: CPT | Performed by: FAMILY MEDICINE

## 2019-09-13 RX ORDER — HYDROCODONE BITARTRATE AND ACETAMINOPHEN 10; 325 MG/1; MG/1
1 TABLET ORAL EVERY 4 HOURS PRN
Qty: 60 TABLET | Refills: 0 | Status: SHIPPED | OUTPATIENT
Start: 2019-09-13 | End: 2019-09-21

## 2019-09-13 RX ORDER — PREDNISONE 10 MG/1
10 TABLET ORAL DAILY
Qty: 42 TABLET | Refills: 0 | Status: SHIPPED | OUTPATIENT
Start: 2019-09-13 | End: 2019-09-19 | Stop reason: HOSPADM

## 2019-09-13 RX ADMIN — METHYLPREDNISOLONE SODIUM SUCCINATE 80 MG: 125 INJECTION, POWDER, FOR SOLUTION INTRAMUSCULAR; INTRAVENOUS at 15:28

## 2019-09-13 RX ADMIN — Medication 10 ML: at 08:30

## 2019-09-13 RX ADMIN — METHYLPREDNISOLONE SODIUM SUCCINATE 80 MG: 125 INJECTION, POWDER, FOR SOLUTION INTRAMUSCULAR; INTRAVENOUS at 06:20

## 2019-09-13 RX ADMIN — HYDROCODONE BITARTRATE AND ACETAMINOPHEN 1 TABLET: 10; 325 TABLET ORAL at 15:36

## 2019-09-13 RX ADMIN — HYDROCODONE BITARTRATE AND ACETAMINOPHEN 1 TABLET: 10; 325 TABLET ORAL at 08:31

## 2019-09-13 RX ADMIN — KETOROLAC TROMETHAMINE 30 MG: 30 INJECTION, SOLUTION INTRAMUSCULAR at 08:31

## 2019-09-13 RX ADMIN — KETOROLAC TROMETHAMINE 30 MG: 30 INJECTION, SOLUTION INTRAMUSCULAR at 15:36

## 2019-09-13 RX ADMIN — DOCUSATE SODIUM 100 MG: 100 CAPSULE, LIQUID FILLED ORAL at 08:27

## 2019-09-13 NOTE — H&P
DATE/TIME OF ADMISSION:  9/11/2019  8:15 PM  Patient Care Team:  Abi Crowley MD as PCP - General (Family Medicine)    Chief complaint COMPLAINS OF LOW BACK PAIN AND RADIATION TO THE RIGHT LEG ASSOCIATED WITH NUMBNESS TO THE RIGHT FOOT FROM THE ARCH TO THE HEEL NOTED FOR THE FIRST TIME Wednesday ON THE WAY TO MY OFFICE  PRESENTED TO MY OFFICE AS ER FOLLOW UP AS HE HAD NO PCP  I ACCEPTED HIM AS A NEW PATIENT  TORADOL 60MG IM WAS GIVEN AFTER EXAM IN MY OFFICE TO MAKE THE RIDE TO THE HOSPITAL MORE TOLERABLE  NO PRIOR HISTORY OF BACK PROBLEMS  NO SPECIFIC INJURY LEADING TO THE BACK PAIN  ER NOTES REVIEWED IN DETAIL  HE WAS PLACED ON A STEROID TAPER AT THE [facility] THE DAY PRIOR TO THE ER VISIT  BACK X RAYS WERE DONE IN THE ER    LATE ENTRY FOR H AND P DUE  TO INABILITY TO ACCESS THE CHART YESTERDAY WITH [employer]  REFERRED TO IT YESTERDAY AND I WAS PHYSICALLY HERE TO SEE HIM A SECOND TIME (OFFICE AND HERE)      Subjective     Patient is a 33 y.o. male presents with LOW BACK PAIN WITH RIGHT LEG PAIN AND NUMBNESS TO THE RIGHT FOOT FROM THE ARCH TO THE HEEL. Onset of symptoms was SUDDEN A FEW DAYS PRIOR FOR THE PAIN---NUMBNESS FOR THE FIRST TIME Wednesday AFTERNOON.  NO KNOWN INJURY LEADING TO THE PAIN. NO PRIOR HISTORY OF LOW BACK PAIN. ON NO MEDS PRIOR TO THIS PRESENTATION OF BACK PAIN, FIRST AT THE [facility] WHERE HE WAS PLACED ON A STEROID PACK AND FLEXERIL, AND THEN IN THE ER WHERE HE HAD X RAYS , WAS GIVEN ONE SHOT OF MS AND ONE NORCO AND RELEASED ON CONTINUED STEROIDS AND NSAIDS. NO FOOT DROP NOTED. NORMAL BOWEL AND BLADDER CONTROL. WORKS CONSTRUCTION.     Review of Systems       History  History reviewed. No pertinent past medical history.  No past surgical history on file.  History reviewed. No pertinent family history.  Social History     Tobacco Use   • Smoking status: Never Smoker   • Smokeless tobacco: Never Used   Substance Use Topics   • Alcohol use: Not on file   • Drug use: No     Medications Prior  to Admission   Medication Sig Dispense Refill Last Dose   • cyclobenzaprine (FLEXERIL) 10 MG tablet Take 10 mg by mouth 3 (Three) Times a Day As Needed for Muscle Spasms.      • PredniSONE 5 MG tablet therapy pack dosepak Take 1 each by mouth. Take as directed on package instructions.  On day 3   9/11/2019 at 0730   • diclofenac (VOLTAREN) 75 MG EC tablet Take 1 tablet by mouth 2 (Two) Times a Day As Needed (back pain). 20 tablet 0      Allergies:  Patient has no known allergies.    Objective     Vital Signs  Temp:  [97.6 °F (36.4 °C)-98.7 °F (37.1 °C)] 98 °F (36.7 °C)  Heart Rate:  [57-71] 63  Resp:  [14-18] 16  BP: (125-178)/() 165/96     Physical Exam:      General Appearance:    Alert, cooperative, in moderate distress due to pain returning after toradol injection in my office was beginning to wear off (given to make the drive to the hospital as a direct admission tolerable)   Head:    Normocephalic, without obvious abnormality, atraumatic   Eyes:            Lids and lashes normal, conjunctivae and sclerae normal, no   icterus, no pallor, corneas clear, PERRLA   Ears:    Ears appear intact with no abnormalities noted   Throat:   No oral lesions, no thrush, oral mucosa moist   Neck:   No adenopathy, supple, trachea midline, no thyromegaly, no   carotid bruit, no JVD   Lungs:     Clear to auscultation,respirations regular, even and                  unlabored    Heart:    Regular rhythm and normal rate, normal S1 and S2, no            murmur, no gallop, no rub, no click   Chest Wall:    No abnormalities observed   Abdomen:     Normal bowel sounds, no masses, no organomegaly, soft        non-tender, non-distended, no guarding, no rebound                Tenderness; slightly overweight   Extremities:   Moves all extremities well but difficulty in raising the right leg, no edema, no cyanosis, no    Redness; no calf tenderness, POSITIVE straight leg raise on the right , but also on the left with pain in the right  leg upon raising the left leg; great toe extensors strong; decreased DTR on the right 1+ and 2+ on the left; scar from ankle surgery on the left noted   Pulses:   Pulses palpable and equal bilaterally   Skin:   No bleeding, bruising or rash   Lymph nodes:   No palpable adenopathy   Neurologic:   Cranial nerves 2 - 12 grossly intact, sensation intact, DTR       present and equal bilaterally except at the knees (1+ right , 2+ left)  MS 5/5       I HAVE PERSONALLY EXAMINED THE PATIENT AND HAVE REVIEWED APPROPRIATE LAB AND IMAGING RESULTS.    Imaging Results (last 24 hours)     Procedure Component Value Units Date/Time    MRI Lumbar Spine Without Contrast [[id number]] Collected:  09/12/19 0922     Updated:  09/12/19 0931    Narrative:       DATE OF EXAM:  9/12/2019 7:32 AM     PROCEDURE:  MRI LUMBAR SPINE WO CONTRAST-     INDICATIONS:  Low back pain, >6wks conservative tx, persistent-progressive sx,  surgical candidate     COMPARISON:  Lumbar spine radiographs dated 9/9/2019     TECHNIQUE:   Routine magnetic resonance imaging of the lumbar spine was performed  without the administration of contrast.     FINDINGS:  There is normal alignment of the lumbar spine without evidence of  spondylolisthesis. The bone marrow signal is within normal limits  without evidence of fracture or marrow infiltrative process. There is  degenerative fatty marrow replacement involving the inferior endplate at  the L5 level. The conus terminates at the L1 vertebral body level which  is normal. There is mild disc desiccation and disc height loss at from  L3-L4 through L5-S1. The visualized portions of the retroperitoneum  appear within normal limits. The paraspinal musculature is symmetric.     Level by level analysis:   L1-L2: There is no disc bulge, spinal canal stenosis, or neuroforaminal  narrowing. There is no facet arthropathy or ligamentum flavum  thickening.  L2-L3: There is no disc bulge, spinal canal stenosis, or  neuroforaminal  narrowing. There is no facet arthropathy or ligamentum flavum  thickening.  L3-L4: There is a minimal broad-based posterior disc bulge without  spinal canal stenosis. There is no significant facet arthropathy or  ligamentum flavum thickening. There is no neuroforaminal narrowing.  L4-L5: There is a broad-based disc bulge, slightly asymmetric to the  right. There is no spinal canal stenosis. There is mild bilateral facet  arthropathy without ligamentum flavum thickening. There is mild right  neural foraminal narrowing secondary to disc and no left neural  foraminal narrowing.  L5-S1: There is a large right paracentral disc protrusion which causes  complete effacement of the right lateral recess and likely  contact/impingement of the traversing nerve root. There is no  significant facet arthropathy or ligamentum flavum thickening. There is  mild right and no left neural foraminal narrowing.       Impression:       1. Large right paracentral disc protrusion at L5-S1 causing complete  effacement of the right lateral recess and likely causes  contact/impingement of the traversing nerve root.     Electronically Signed By-Kennedy Dalton On:9/12/2019 9:28 AM  This report was finalized on [id number] by  Kennedy Dalton, .           Lab Results (last 24 hours)     Procedure Component Value Units Date/Time    Hemoglobin A1c [[id number]]  (Normal) Collected:  09/12/19 0714    Specimen:  Blood Updated:  09/12/19 1342     Hemoglobin A1C 5.2 %     Narrative:       Hemoglobin A1C Reference Range:    <5.7 %        Normal  5.7-6.4 %     Increased risk for diabetes  > 6.4 %        Diabetes       These guidelines have been recommended by the American Diabetic Association for Hgb A1c.      The following 2010 guidelines have been recommended by the American Diabetes Association for Hemoglobin A1c.    HBA1c 5.7-6.4% Increased risk for future diabetes (pre-diabetes)  HBA1c     >6.4% Diabetes    Comprehensive Metabolic  Panel [[id number]]  (Abnormal) Collected:  09/12/19 0714    Specimen:  Blood Updated:  09/12/19 0839     Glucose 127 mg/dL      BUN 11 mg/dL      Creatinine 0.80 mg/dL      Sodium 140 mmol/L      Potassium 4.6 mmol/L      Chloride 103 mmol/L      CO2 28.0 mmol/L      Calcium 9.3 mg/dL      Total Protein 6.1 g/dL      Albumin 3.80 g/dL      ALT (SGPT) 81 U/L      AST (SGOT) 52 U/L      Alkaline Phosphatase 50 U/L      Total Bilirubin 0.5 mg/dL      eGFR Non African Amer 112 mL/min/1.73      Globulin 2.3 gm/dL      A/G Ratio 1.7 g/dL      BUN/Creatinine Ratio 13.8     Anion Gap 13.6 mmol/L     CBC & Differential [[id number]] Collected:  09/12/19 0714    Specimen:  Blood Updated:  09/12/19 0807    Narrative:       The following orders were created for panel order CBC & Differential.  Procedure                               Abnormality         Status                     ---------                               -----------         ------                     CBC Auto Differential[[id number]]        Abnormal            Final result                 Please view results for these tests on the individual orders.    CBC Auto Differential [[id number]]  (Abnormal) Collected:  09/12/19 0714    Specimen:  Blood Updated:  09/12/19 0807     WBC 11.10 10*3/mm3      RBC 4.87 10*6/mm3      Hemoglobin 15.0 g/dL      Hematocrit 45.2 %      MCV 92.9 fL      MCH 30.9 pg      MCHC 33.2 g/dL      RDW 13.7 %      RDW-SD 45.1 fl      MPV 9.7 fL      Platelets 236 10*3/mm3      Neutrophil % 86.0 %      Lymphocyte % 11.2 %      Monocyte % 2.6 %      Eosinophil % 0.0 %      Basophil % 0.2 %      Neutrophils, Absolute 9.50 10*3/mm3      Lymphocytes, Absolute 1.20 10*3/mm3      Monocytes, Absolute 0.30 10*3/mm3      Eosinophils, Absolute 0.00 10*3/mm3      Basophils, Absolute 0.00 10*3/mm3      nRBC 0.1 /100 WBC     Comprehensive Metabolic Panel [[id number]]  (Abnormal) Collected:  09/11/19 2128    Specimen:  Blood Updated:  09/11/19 2209     Glucose 154  mg/dL      BUN 10 mg/dL      Creatinine 0.90 mg/dL      Sodium 140 mmol/L      Potassium 3.2 mmol/L      Chloride 101 mmol/L      CO2 26.0 mmol/L      Calcium 8.8 mg/dL      Total Protein 6.1 g/dL      Albumin 3.50 g/dL      ALT (SGPT) 67 U/L      AST (SGOT) 47 U/L      Alkaline Phosphatase 45 U/L      Total Bilirubin 0.6 mg/dL      eGFR Non African Amer 98 mL/min/1.73      Globulin 2.6 gm/dL      A/G Ratio 1.3 g/dL      BUN/Creatinine Ratio 11.1     Anion Gap 16.2 mmol/L     CBC & Differential [[id number]] Collected:  09/11/19 2129    Specimen:  Blood Updated:  09/11/19 2150    Narrative:       The following orders were created for panel order CBC & Differential.  Procedure                               Abnormality         Status                     ---------                               -----------         ------                     CBC Auto Differential[[id number]]        Abnormal            Final result                 Please view results for these tests on the individual orders.    CBC Auto Differential [[id number]]  (Abnormal) Collected:  09/11/19 2129    Specimen:  Blood Updated:  09/11/19 2150     WBC 10.60 10*3/mm3      RBC 4.79 10*6/mm3      Hemoglobin 14.8 g/dL      Hematocrit 44.2 %      MCV 92.2 fL      MCH 30.9 pg      MCHC 33.5 g/dL      RDW 13.7 %      RDW-SD 44.2 fl      MPV 9.6 fL      Platelets 223 10*3/mm3      Neutrophil % 75.5 %      Lymphocyte % 19.9 %      Monocyte % 4.0 %      Eosinophil % 0.2 %      Basophil % 0.4 %      Neutrophils, Absolute 8.00 10*3/mm3      Lymphocytes, Absolute 2.10 10*3/mm3      Monocytes, Absolute 0.40 10*3/mm3      Eosinophils, Absolute 0.00 10*3/mm3      Basophils, Absolute 0.00 10*3/mm3      nRBC 0.1 /100 WBC            I reviewed the patient's new clinical results.    Assessment/Plan     Principal Problem:    Herniation of intervertebral disc between L5 and S1---DR GARCIA TO CONSULT; IV SOLUMEDROL; KETOROLAC; MS IF NEEDED; NORCO PO PRN; MRI ORDERED AND DONE; WILL  STRIVE FOR CONSERVATIVE TREATMENT IF AT ALL POSSIBLE WITH OUTPT PT AND PREDNISONE TAPER; IF DOING WELL COULD CONSIDER DISCHARGE TOMORROW AFTER CONSULTATION WITH DR GARCIA  Active Problems:    Intractable back pain---NEW PROBLEM, ACUTE IN NATURE    Steroid-induced hyperglycemia---NORMAL HGB A1C; DISCUSSED LOW CARB DIET, EXERCISE WHEN ABLE , WEIGHT LOSS, AND AWARENESS OF INCREASED RISK OF ISSUES LATER IN LIFE; WILL CHECK Q AC AND Q HS FINGER STICK BLOOD SUGARS; TONIGHT, RANDOM  AFTER A NUTRIGRAIN BAR           I discussed the patients findings and my recommendations with patient and family (fiance and mom)     Abi Crowley MD  09/12/19  8:42 PM

## 2019-09-13 NOTE — PLAN OF CARE
Problem: Patient Care Overview  Goal: Interprofessional Rounds/Family Conf  Outcome: Outcome(s) achieved Date Met: 09/13/19

## 2019-09-13 NOTE — PLAN OF CARE
Problem: Pain, Chronic (Adult)  Goal: Identify Related Risk Factors and Signs and Symptoms  Outcome: Outcome(s) achieved Date Met: 09/13/19

## 2019-09-13 NOTE — CONSULTS
Reason for consult: Large L5-S1 disc herniation    Requesting physician: Dr. 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  Several days ago, the pain became excruciating and began radiating into his right hip and down his leg.  The pain became incapacitating, and he is been admitted to the hospital.  He has been placed on analgesics and steroids, and now he says he is quite a bit better.  He is ambulatory, but he still has back hip and leg pain.  No history of any recent injury.    Physical exam: He is awake alert and oriented in moderate distress with back pain.  He is able to transfer and move about in bed with moderate discomfort.  Diffuse tenderness across the lumbar area.  Straight leg raising is positive on the right.  There is no cyanosis clubbing or edema.  Motor exam reveals normal tone bulk and 5 out of 5 strength in his extremities.  There is an absent right Achilles reflex and numbness in the right foot.    Lumbar MRI: There is degenerative disc disease at L3-4 L4-5 L5-S1.  The dominant finding is a massive right-sided disc herniation at L5-S1 causing severe lateral recess and foraminal stenosis and impingement of the right S1 nerve root.    Assessment plan: I had an extensive discussion with the patient, his wife, and Dr. Crowley.  His options include nonoperative treatment with physical therapy and some possible lumbar epidural steroid injections through pain management versus minimally invasive right L5-S1 discectomy.  I think he is probably going to require surgical intervention because of the amount of pain, the neurologic deficit, and his impressive MRI findings.  We reviewed surgery prognosis recovery risks and limitations in detail, and he understands.  He understands it is possible to have surgery not be any better.  He understands risks of surgery include death, stroke, heart  attack, infection, hemorrhage, nerve damage, CSF leak, recurrence, and failure to achieve desired results.  Since he is doing a little better and is ambulatory, he would like to go home and consider his options.  If he wishes to proceed with surgical intervention, we can work on scheduling without delay.  If he has any questions he can give the office a call.

## 2019-09-13 NOTE — DISCHARGE SUMMARY
DATE/TIME OF ADMISSION:  9/11/2019  8:15 PM  Date of Discharge:  9/13/2019    Discharge Diagnosis:     Presenting Problem/History of Present Illness  Active Hospital Problems    Diagnosis  POA   • **Herniation of intervertebral disc between L5 and S1 [M51.27]  Yes   • Steroid-induced hyperglycemia [R73.9, T38.0X5A]  Yes   • Intractable back pain [M54.9]  Yes      Resolved Hospital Problems   No resolved problems to display.          Hospital Course  Bo Kay is a 33 y.o. male who presents with ACUTE SEVERE LOW BACK PAIN WITH RIGHT LEG RADIATION, AND RIGHT ARCH OF THE FOOT NUMBNESS. HE HAD PREVIOUSLY FAILED OUTPT MANAGEMENT AND WAS DIRECTLY ADMITTED FROM MY OFFICE FOR PAIN MANAGEMENT AND FURTHER DIAGNOSTIC STUDIES. MRI WAS DONE AND L5-S1 HERNIATION TO THE RIGHT WAS FOUND. WITH IV STEROIDS, PAIN MEDICATION, IV KETOROLAC, AND REST, THE PAIN HAS IMPROVED GREATLY. HE HAS BEEN MOST AGREEABLE WITH CARE. DR GARCIA CONSULTED AND FEELS THAT DUE TO THE SIZE OF THE HERNIATION, THAT THE BEST COURSE WILL BE TO PROCEED WITH SURGERY NEXT WEEK. I ANSWERED ALL OF MY NEW PATIENT'S QUESTIONS AND HE WILL BE DISCHARGED HOME. HE KNOWS TO CALL AT ONCE FOR FOOT DROP, INCREASED PAIN, NUMBNESS AT THE RECTUM, OR OTHER ISSUES. HE IS TO CONTACT DR GARCIA TO GET SURGERY SET UP.      Procedures Performed  MRI OF THE L-S SPINE       Consults:   Consults     Date and Time Order Name Status Description    9/12/2019 2038 Inpatient Neurosurgery Consult Completed           Pertinent Test Results:    Lab Results (most recent)     Procedure Component Value Units Date/Time    CBC & Differential [[id number]] Collected:  09/13/19 0330    Specimen:  Blood Updated:  09/13/19 0533    Narrative:       The following orders were created for panel order CBC & Differential.  Procedure                               Abnormality         Status                     ---------                               -----------         ------                     CBC Auto  Differential[[id number]]        Abnormal            Final result                 Please view results for these tests on the individual orders.    CBC Auto Differential [[id number]]  (Abnormal) Collected:  09/13/19 0330    Specimen:  Blood Updated:  09/13/19 0533     WBC 15.90 10*3/mm3      RBC 4.91 10*6/mm3      Hemoglobin 15.1 g/dL      Hematocrit 45.2 %      MCV 92.0 fL      MCH 30.8 pg      MCHC 33.5 g/dL      RDW 13.8 %      RDW-SD 44.2 fl      MPV 9.0 fL      Platelets 258 10*3/mm3     Scan Slide [[id number]] Collected:  09/13/19 0330    Specimen:  Blood Updated:  09/13/19 0533     Scan Slide --     Comment: See Manual Differential Results       Manual Differential [[id number]]  (Abnormal) Collected:  09/13/19 0330    Specimen:  Blood Updated:  09/13/19 0533     Neutrophil % 84.0 %      Lymphocyte % 4.0 %      Monocyte % 3.0 %      Bands %  9.0 %      Neutrophils Absolute 14.79 10*3/mm3      Lymphocytes Absolute 0.64 10*3/mm3      Monocytes Absolute 0.48 10*3/mm3      RBC Morphology Normal     Toxic Granulation Slight/1+     Giant Platelets Slight/1+    Comprehensive Metabolic Panel [[id number]]  (Abnormal) Collected:  09/13/19 0330    Specimen:  Blood Updated:  09/13/19 0411     Glucose 164 mg/dL      BUN 17 mg/dL      Creatinine 0.90 mg/dL      Sodium 138 mmol/L      Potassium 4.2 mmol/L      Chloride 101 mmol/L      CO2 29.0 mmol/L      Calcium 9.0 mg/dL      Total Protein 5.7 g/dL      Albumin 3.30 g/dL      ALT (SGPT) 63 U/L      AST (SGOT) 29 U/L      Alkaline Phosphatase 48 U/L      Total Bilirubin 0.5 mg/dL      eGFR Non African Amer 98 mL/min/1.73      Globulin 2.4 gm/dL      A/G Ratio 1.4 g/dL      BUN/Creatinine Ratio 18.9     Anion Gap 12.2 mmol/L     Magnesium [[id number]]  (Normal) Collected:  09/13/19 0330    Specimen:  Blood Updated:  09/13/19 0410     Magnesium 2.3 mg/dL     POC Glucose Once [[id number]]  (Abnormal) Collected:  09/12/19 2042    Specimen:  Blood Updated:  09/12/19 2044     Glucose 194  mg/dL      Comment: Serial Number: [id number]  [id number]       Hemoglobin A1c [[id number]]  (Normal) Collected:  09/12/19 0714    Specimen:  Blood Updated:  09/12/19 1342     Hemoglobin A1C 5.2 %     Narrative:       Hemoglobin A1C Reference Range:    <5.7 %        Normal  5.7-6.4 %     Increased risk for diabetes  > 6.4 %        Diabetes       These guidelines have been recommended by the American Diabetic Association for Hgb A1c.      The following 2010 guidelines have been recommended by the American Diabetes Association for Hemoglobin A1c.    HBA1c 5.7-6.4% Increased risk for future diabetes (pre-diabetes)  HBA1c     >6.4% Diabetes    Comprehensive Metabolic Panel [[id number]]  (Abnormal) Collected:  09/12/19 0714    Specimen:  Blood Updated:  09/12/19 0839     Glucose 127 mg/dL      BUN 11 mg/dL      Creatinine 0.80 mg/dL      Sodium 140 mmol/L      Potassium 4.6 mmol/L      Chloride 103 mmol/L      CO2 28.0 mmol/L      Calcium 9.3 mg/dL      Total Protein 6.1 g/dL      Albumin 3.80 g/dL      ALT (SGPT) 81 U/L      AST (SGOT) 52 U/L      Alkaline Phosphatase 50 U/L      Total Bilirubin 0.5 mg/dL      eGFR Non African Amer 112 mL/min/1.73      Globulin 2.3 gm/dL      A/G Ratio 1.7 g/dL      BUN/Creatinine Ratio 13.8     Anion Gap 13.6 mmol/L     CBC & Differential [[id number]] Collected:  09/12/19 0714    Specimen:  Blood Updated:  09/12/19 0807    Narrative:       The following orders were created for panel order CBC & Differential.  Procedure                               Abnormality         Status                     ---------                               -----------         ------                     CBC Auto Differential[[id number]]        Abnormal            Final result                 Please view results for these tests on the individual orders.    CBC Auto Differential [[id number]]  (Abnormal) Collected:  09/12/19 0714    Specimen:  Blood Updated:  09/12/19 0807     WBC 11.10 10*3/mm3      RBC 4.87  10*6/mm3      Hemoglobin 15.0 g/dL      Hematocrit 45.2 %      MCV 92.9 fL      MCH 30.9 pg      MCHC 33.2 g/dL      RDW 13.7 %      RDW-SD 45.1 fl      MPV 9.7 fL      Platelets 236 10*3/mm3      Neutrophil % 86.0 %      Lymphocyte % 11.2 %      Monocyte % 2.6 %      Eosinophil % 0.0 %      Basophil % 0.2 %      Neutrophils, Absolute 9.50 10*3/mm3      Lymphocytes, Absolute 1.20 10*3/mm3      Monocytes, Absolute 0.30 10*3/mm3      Eosinophils, Absolute 0.00 10*3/mm3      Basophils, Absolute 0.00 10*3/mm3      nRBC 0.1 /100 WBC                  DOING WELL CURRENTLY       Condition on Discharge:      Vital Signs  Temp:  [97.4 °F (36.3 °C)-98.7 °F (37.1 °C)] 97.5 °F (36.4 °C)  Heart Rate:  [53-67] 53  Resp:  [12-18] 12  BP: (129-165)/(68-96) 155/82    Physical Exam:     General Appearance:    Alert, cooperative, in no acute distress   Head:    Normocephalic, without obvious abnormality, atraumatic   Eyes:            Lids and lashes normal, conjunctivae and sclerae normal, no   icterus, no pallor, corneas clear, PERRLA   Ears:    Ears appear intact with no abnormalities noted   Throat:   No oral lesions, no thrush, oral mucosa moist   Neck:   No adenopathy, supple, trachea midline, no thyromegaly, no   carotid bruit, no JVD   Lungs:     Clear to auscultation,respirations regular, even and                  unlabored    Heart:    Regular rhythm and normal rate, normal S1 and S2, no            murmur, no gallop, no rub, no click   Chest Wall:    No abnormalities observed   Abdomen:     Normal bowel sounds, no masses, no organomegaly, soft        non-tender, non-distended, no guarding, no rebound                tenderness   Extremities:   Moves all extremities well, no edema, no cyanosis, no             redness   Pulses:   Pulses palpable and equal bilaterally   Skin:   No bleeding, bruising or rash   Lymph nodes:   No palpable adenopathy   Neurologic:   Cranial nerves 2 - 12 grossly intact, sensation intact, DTR        present and equal bilaterally       Discharge Disposition  Home or Self Care    Discharge Medications     Discharge Medications      New Medications      Instructions Start Date   HYDROcodone-acetaminophen  MG per tablet  Commonly known as:  NORCO   1 tablet, Oral, Every 4 Hours PRN, DO NOT TAKE AND DRIVE         Continue These Medications      Instructions Start Date   cyclobenzaprine 10 MG tablet  Commonly known as:  FLEXERIL   10 mg, Oral, 3 Times Daily PRN         Stop These Medications    diclofenac 75 MG EC tablet  Commonly known as:  VOLTAREN     PredniSONE 5 MG tablet therapy pack dosepak            Discharge Diet: HEALTHY HEART, LOW CARB    Activity at Discharge: HOME REST, NO WORK; LIMIT SITTING    Follow-up Appointments  No future appointments.   WILL GET RECORDS AND WILL ALERT ME WHEN HE RETURNS FOR SURGERY      Test Results Pending at Discharge NONE       Abi Crowley MD  09/13/19  4:37 PM

## 2019-09-13 NOTE — PROGRESS NOTES
"DATE/TIME OF ADMISSION:  9/11/2019  8:15 PM     LOS: 2 days   Patient Care Team:  Abi Crowley MD as PCP - General (Family Medicine)  Consults     Date and Time Order Name Status Description    9/12/2019 2038 Inpatient Neurosurgery Consult            Subjective DID OK OVERNIGHT  TAKING MINIMAL PAIN MEDS (ONLY KETOROLAC AND NORCO)  ABLE TO AMBULATE   CONSULT FROM DR GARCIA TODAY ABOUT MRI FINDINGS LED TO THE RECOMMENDATION FOR SURGERY NEXT WEEK. DR GARCIA DOES NOT FEEL THAT THIS LARGE DISK HERNIATION WILL BE ABLE TO BE TREATED BY PT AND REST, THOUGH SOME BETTER WITH REST AND STEROIDS.    Interval History: ABNORMAL MRI    Patient Complaints: NONE CURRENTLY  FEELS COMFORTABLE WITH DR GARCIA AND THE PLAN TO PROCEED WITH SURGERY    History taken from: patient    Review of Systems STILL WITH RIGHT ARCH OF THE FOOT WITH NUMBNESS  NO FOOT DROP       Objective     Vital Signs  /75 (BP Location: Right arm, Patient Position: Lying)   Pulse 67   Temp 97.5 °F (36.4 °C) (Oral)   Resp 14   Ht 175.3 cm (69\")   Wt 113 kg (249 lb 1.9 oz)   SpO2 97%   BMI 36.79 kg/m²     Physical Exam:     General Appearance:    Alert, cooperative, in no acute distress   Head:    Normocephalic, without obvious abnormality, atraumatic   Eyes:            Lids and lashes normal, conjunctivae and sclerae normal, no   icterus, no pallor, corneas clear, PERRLA   Ears:    Ears appear intact with no abnormalities noted   Throat:   No oral lesions, no thrush, oral mucosa moist   Neck:   No adenopathy, supple, trachea midline, no thyromegaly, no   carotid bruit, no JVD   Lungs:     Clear to auscultation,respirations regular, even and                  unlabored    Heart:    Regular rhythm and normal rate, normal S1 and S2, no            murmur, no gallop, no rub, no click   Chest Wall:    No abnormalities observed   Abdomen:     Normal bowel sounds, no masses, no organomegaly, soft        non-tender, non-distended, no guarding, no rebound            "     tenderness   Extremities:   Moves all extremities well, no edema, no cyanosis, no             Redness; STRONG GREAT TOE EXTENSORS; LESS OF A STRAIGHT LEG RAISE ON THE RIGHT AND LEFT NOTED; CAN NOW RAISE TO 45 DEGREES WITHOUT THE DEVELOPMENT OF PAIN   Pulses:   Pulses palpable and equal bilaterally   Skin:   No bleeding, bruising or rash   Lymph nodes:   No palpable adenopathy   Neurologic:   Grossly normal, alert and O x 3        I HAVE PERSONALLY EXAMINED AND REVIEWED THE PATIENT'S RECORD     Lab Results (last 48 hours)     Procedure Component Value Units Date/Time    CBC & Differential [[id number]] Collected:  09/13/19 0330    Specimen:  Blood Updated:  09/13/19 0533    Narrative:       The following orders were created for panel order CBC & Differential.  Procedure                               Abnormality         Status                     ---------                               -----------         ------                     CBC Auto Differential[[id number]]        Abnormal            Final result                 Please view results for these tests on the individual orders.    CBC Auto Differential [[id number]]  (Abnormal) Collected:  09/13/19 0330    Specimen:  Blood Updated:  09/13/19 0533     WBC 15.90 10*3/mm3      RBC 4.91 10*6/mm3      Hemoglobin 15.1 g/dL      Hematocrit 45.2 %      MCV 92.0 fL      MCH 30.8 pg      MCHC 33.5 g/dL      RDW 13.8 %      RDW-SD 44.2 fl      MPV 9.0 fL      Platelets 258 10*3/mm3     Scan Slide [[id number]] Collected:  09/13/19 0330    Specimen:  Blood Updated:  09/13/19 0533     Scan Slide --     Comment: See Manual Differential Results       Manual Differential [[id number]]  (Abnormal) Collected:  09/13/19 0330    Specimen:  Blood Updated:  09/13/19 0533     Neutrophil % 84.0 %      Lymphocyte % 4.0 %      Monocyte % 3.0 %      Bands %  9.0 %      Neutrophils Absolute 14.79 10*3/mm3      Lymphocytes Absolute 0.64 10*3/mm3      Monocytes Absolute 0.48 10*3/mm3      RBC  Morphology Normal     Toxic Granulation Slight/1+     Giant Platelets Slight/1+    Comprehensive Metabolic Panel [[id number]]  (Abnormal) Collected:  09/13/19 0330    Specimen:  Blood Updated:  09/13/19 0411     Glucose 164 mg/dL      BUN 17 mg/dL      Creatinine 0.90 mg/dL      Sodium 138 mmol/L      Potassium 4.2 mmol/L      Chloride 101 mmol/L      CO2 29.0 mmol/L      Calcium 9.0 mg/dL      Total Protein 5.7 g/dL      Albumin 3.30 g/dL      ALT (SGPT) 63 U/L      AST (SGOT) 29 U/L      Alkaline Phosphatase 48 U/L      Total Bilirubin 0.5 mg/dL      eGFR Non African Amer 98 mL/min/1.73      Globulin 2.4 gm/dL      A/G Ratio 1.4 g/dL      BUN/Creatinine Ratio 18.9     Anion Gap 12.2 mmol/L     Magnesium [[id number]]  (Normal) Collected:  09/13/19 0330    Specimen:  Blood Updated:  09/13/19 0410     Magnesium 2.3 mg/dL     POC Glucose Once [[id number]]  (Abnormal) Collected:  09/12/19 2042    Specimen:  Blood Updated:  09/12/19 2044     Glucose 194 mg/dL      Comment: Serial Number: [id number]:  [id number]       Hemoglobin A1c [[id number]]  (Normal) Collected:  09/12/19 0714    Specimen:  Blood Updated:  09/12/19 1342     Hemoglobin A1C 5.2 %     Narrative:       Hemoglobin A1C Reference Range:    <5.7 %        Normal  5.7-6.4 %     Increased risk for diabetes  > 6.4 %        Diabetes       These guidelines have been recommended by the American Diabetic Association for Hgb A1c.      The following 2010 guidelines have been recommended by the American Diabetes Association for Hemoglobin A1c.    HBA1c 5.7-6.4% Increased risk for future diabetes (pre-diabetes)  HBA1c     >6.4% Diabetes    Comprehensive Metabolic Panel [[id number]]  (Abnormal) Collected:  09/12/19 0714    Specimen:  Blood Updated:  09/12/19 0839     Glucose 127 mg/dL      BUN 11 mg/dL      Creatinine 0.80 mg/dL      Sodium 140 mmol/L      Potassium 4.6 mmol/L      Chloride 103 mmol/L      CO2 28.0 mmol/L      Calcium 9.3 mg/dL      Total Protein 6.1  g/dL      Albumin 3.80 g/dL      ALT (SGPT) 81 U/L      AST (SGOT) 52 U/L      Alkaline Phosphatase 50 U/L      Total Bilirubin 0.5 mg/dL      eGFR Non African Amer 112 mL/min/1.73      Globulin 2.3 gm/dL      A/G Ratio 1.7 g/dL      BUN/Creatinine Ratio 13.8     Anion Gap 13.6 mmol/L     CBC & Differential [[id number]] Collected:  09/12/19 0714    Specimen:  Blood Updated:  09/12/19 0807    Narrative:       The following orders were created for panel order CBC & Differential.  Procedure                               Abnormality         Status                     ---------                               -----------         ------                     CBC Auto Differential[[id number]]        Abnormal            Final result                 Please view results for these tests on the individual orders.    CBC Auto Differential [[id number]]  (Abnormal) Collected:  09/12/19 0714    Specimen:  Blood Updated:  09/12/19 0807     WBC 11.10 10*3/mm3      RBC 4.87 10*6/mm3      Hemoglobin 15.0 g/dL      Hematocrit 45.2 %      MCV 92.9 fL      MCH 30.9 pg      MCHC 33.2 g/dL      RDW 13.7 %      RDW-SD 45.1 fl      MPV 9.7 fL      Platelets 236 10*3/mm3      Neutrophil % 86.0 %      Lymphocyte % 11.2 %      Monocyte % 2.6 %      Eosinophil % 0.0 %      Basophil % 0.2 %      Neutrophils, Absolute 9.50 10*3/mm3      Lymphocytes, Absolute 1.20 10*3/mm3      Monocytes, Absolute 0.30 10*3/mm3      Eosinophils, Absolute 0.00 10*3/mm3      Basophils, Absolute 0.00 10*3/mm3      nRBC 0.1 /100 WBC     Comprehensive Metabolic Panel [[id number]]  (Abnormal) Collected:  09/11/19 2128    Specimen:  Blood Updated:  09/11/19 2209     Glucose 154 mg/dL      BUN 10 mg/dL      Creatinine 0.90 mg/dL      Sodium 140 mmol/L      Potassium 3.2 mmol/L      Chloride 101 mmol/L      CO2 26.0 mmol/L      Calcium 8.8 mg/dL      Total Protein 6.1 g/dL      Albumin 3.50 g/dL      ALT (SGPT) 67 U/L      AST (SGOT) 47 U/L      Alkaline Phosphatase 45 U/L       Total Bilirubin 0.6 mg/dL      eGFR Non African Amer 98 mL/min/1.73      Globulin 2.6 gm/dL      A/G Ratio 1.3 g/dL      BUN/Creatinine Ratio 11.1     Anion Gap 16.2 mmol/L     CBC & Differential [[id number]] Collected:  09/11/19 2129    Specimen:  Blood Updated:  09/11/19 2150    Narrative:       The following orders were created for panel order CBC & Differential.  Procedure                               Abnormality         Status                     ---------                               -----------         ------                     CBC Auto Differential[[id number]]        Abnormal            Final result                 Please view results for these tests on the individual orders.    CBC Auto Differential [[id number]]  (Abnormal) Collected:  09/11/19 2129    Specimen:  Blood Updated:  09/11/19 2150     WBC 10.60 10*3/mm3      RBC 4.79 10*6/mm3      Hemoglobin 14.8 g/dL      Hematocrit 44.2 %      MCV 92.2 fL      MCH 30.9 pg      MCHC 33.5 g/dL      RDW 13.7 %      RDW-SD 44.2 fl      MPV 9.6 fL      Platelets 223 10*3/mm3      Neutrophil % 75.5 %      Lymphocyte % 19.9 %      Monocyte % 4.0 %      Eosinophil % 0.2 %      Basophil % 0.4 %      Neutrophils, Absolute 8.00 10*3/mm3      Lymphocytes, Absolute 2.10 10*3/mm3      Monocytes, Absolute 0.40 10*3/mm3      Eosinophils, Absolute 0.00 10*3/mm3      Basophils, Absolute 0.00 10*3/mm3      nRBC 0.1 /100 WBC            Lab Results (last 48 hours)     Procedure Component Value Units Date/Time    CBC & Differential [[id number]] Collected:  09/13/19 0330    Specimen:  Blood Updated:  09/13/19 0533    Narrative:       The following orders were created for panel order CBC & Differential.  Procedure                               Abnormality         Status                     ---------                               -----------         ------                     CBC Auto Differential[[id number]]        Abnormal            Final result                 Please view results  for these tests on the individual orders.    CBC Auto Differential [[id number]]  (Abnormal) Collected:  09/13/19 0330    Specimen:  Blood Updated:  09/13/19 0533     WBC 15.90 10*3/mm3      RBC 4.91 10*6/mm3      Hemoglobin 15.1 g/dL      Hematocrit 45.2 %      MCV 92.0 fL      MCH 30.8 pg      MCHC 33.5 g/dL      RDW 13.8 %      RDW-SD 44.2 fl      MPV 9.0 fL      Platelets 258 10*3/mm3     Scan Slide [[id number]] Collected:  09/13/19 0330    Specimen:  Blood Updated:  09/13/19 0533     Scan Slide --     Comment: See Manual Differential Results       Manual Differential [[id number]]  (Abnormal) Collected:  09/13/19 0330    Specimen:  Blood Updated:  09/13/19 0533     Neutrophil % 84.0 %      Lymphocyte % 4.0 %      Monocyte % 3.0 %      Bands %  9.0 %      Neutrophils Absolute 14.79 10*3/mm3      Lymphocytes Absolute 0.64 10*3/mm3      Monocytes Absolute 0.48 10*3/mm3      RBC Morphology Normal     Toxic Granulation Slight/1+     Giant Platelets Slight/1+    Comprehensive Metabolic Panel [[id number]]  (Abnormal) Collected:  09/13/19 0330    Specimen:  Blood Updated:  09/13/19 0411     Glucose 164 mg/dL      BUN 17 mg/dL      Creatinine 0.90 mg/dL      Sodium 138 mmol/L      Potassium 4.2 mmol/L      Chloride 101 mmol/L      CO2 29.0 mmol/L      Calcium 9.0 mg/dL      Total Protein 5.7 g/dL      Albumin 3.30 g/dL      ALT (SGPT) 63 U/L      AST (SGOT) 29 U/L      Alkaline Phosphatase 48 U/L      Total Bilirubin 0.5 mg/dL      eGFR Non African Amer 98 mL/min/1.73      Globulin 2.4 gm/dL      A/G Ratio 1.4 g/dL      BUN/Creatinine Ratio 18.9     Anion Gap 12.2 mmol/L     Magnesium [[id number]]  (Normal) Collected:  09/13/19 0330    Specimen:  Blood Updated:  09/13/19 0410     Magnesium 2.3 mg/dL     POC Glucose Once [[id number]]  (Abnormal) Collected:  09/12/19 2042    Specimen:  Blood Updated:  09/12/19 2044     Glucose 194 mg/dL      Comment: Serial Number: [id number]:  [id number]       Hemoglobin A1c [[id number]]   (Normal) Collected:  09/12/19 0714    Specimen:  Blood Updated:  09/12/19 1342     Hemoglobin A1C 5.2 %     Narrative:       Hemoglobin A1C Reference Range:    <5.7 %        Normal  5.7-6.4 %     Increased risk for diabetes  > 6.4 %        Diabetes       These guidelines have been recommended by the American Diabetic Association for Hgb A1c.      The following 2010 guidelines have been recommended by the American Diabetes Association for Hemoglobin A1c.    HBA1c 5.7-6.4% Increased risk for future diabetes (pre-diabetes)  HBA1c     >6.4% Diabetes    Comprehensive Metabolic Panel [[id number]]  (Abnormal) Collected:  09/12/19 0714    Specimen:  Blood Updated:  09/12/19 0839     Glucose 127 mg/dL      BUN 11 mg/dL      Creatinine 0.80 mg/dL      Sodium 140 mmol/L      Potassium 4.6 mmol/L      Chloride 103 mmol/L      CO2 28.0 mmol/L      Calcium 9.3 mg/dL      Total Protein 6.1 g/dL      Albumin 3.80 g/dL      ALT (SGPT) 81 U/L      AST (SGOT) 52 U/L      Alkaline Phosphatase 50 U/L      Total Bilirubin 0.5 mg/dL      eGFR Non African Amer 112 mL/min/1.73      Globulin 2.3 gm/dL      A/G Ratio 1.7 g/dL      BUN/Creatinine Ratio 13.8     Anion Gap 13.6 mmol/L     CBC & Differential [[id number]] Collected:  09/12/19 0714    Specimen:  Blood Updated:  09/12/19 0807    Narrative:       The following orders were created for panel order CBC & Differential.  Procedure                               Abnormality         Status                     ---------                               -----------         ------                     CBC Auto Differential[[id number]]        Abnormal            Final result                 Please view results for these tests on the individual orders.    CBC Auto Differential [[id number]]  (Abnormal) Collected:  09/12/19 0714    Specimen:  Blood Updated:  09/12/19 0807     WBC 11.10 10*3/mm3      RBC 4.87 10*6/mm3      Hemoglobin 15.0 g/dL      Hematocrit 45.2 %      MCV 92.9 fL      MCH 30.9 pg       MCHC 33.2 g/dL      RDW 13.7 %      RDW-SD 45.1 fl      MPV 9.7 fL      Platelets 236 10*3/mm3      Neutrophil % 86.0 %      Lymphocyte % 11.2 %      Monocyte % 2.6 %      Eosinophil % 0.0 %      Basophil % 0.2 %      Neutrophils, Absolute 9.50 10*3/mm3      Lymphocytes, Absolute 1.20 10*3/mm3      Monocytes, Absolute 0.30 10*3/mm3      Eosinophils, Absolute 0.00 10*3/mm3      Basophils, Absolute 0.00 10*3/mm3      nRBC 0.1 /100 WBC     Comprehensive Metabolic Panel [[id number]]  (Abnormal) Collected:  09/11/19 2128    Specimen:  Blood Updated:  09/11/19 2209     Glucose 154 mg/dL      BUN 10 mg/dL      Creatinine 0.90 mg/dL      Sodium 140 mmol/L      Potassium 3.2 mmol/L      Chloride 101 mmol/L      CO2 26.0 mmol/L      Calcium 8.8 mg/dL      Total Protein 6.1 g/dL      Albumin 3.50 g/dL      ALT (SGPT) 67 U/L      AST (SGOT) 47 U/L      Alkaline Phosphatase 45 U/L      Total Bilirubin 0.6 mg/dL      eGFR Non African Amer 98 mL/min/1.73      Globulin 2.6 gm/dL      A/G Ratio 1.3 g/dL      BUN/Creatinine Ratio 11.1     Anion Gap 16.2 mmol/L     CBC & Differential [[id number]] Collected:  09/11/19 2129    Specimen:  Blood Updated:  09/11/19 2150    Narrative:       The following orders were created for panel order CBC & Differential.  Procedure                               Abnormality         Status                     ---------                               -----------         ------                     CBC Auto Differential[[id number]]        Abnormal            Final result                 Please view results for these tests on the individual orders.    CBC Auto Differential [[id number]]  (Abnormal) Collected:  09/11/19 2129    Specimen:  Blood Updated:  09/11/19 2150     WBC 10.60 10*3/mm3      RBC 4.79 10*6/mm3      Hemoglobin 14.8 g/dL      Hematocrit 44.2 %      MCV 92.2 fL      MCH 30.9 pg      MCHC 33.5 g/dL      RDW 13.7 %      RDW-SD 44.2 fl      MPV 9.6 fL      Platelets 223 10*3/mm3      Neutrophil % 75.5  %      Lymphocyte % 19.9 %      Monocyte % 4.0 %      Eosinophil % 0.2 %      Basophil % 0.4 %      Neutrophils, Absolute 8.00 10*3/mm3      Lymphocytes, Absolute 2.10 10*3/mm3      Monocytes, Absolute 0.40 10*3/mm3      Eosinophils, Absolute 0.00 10*3/mm3      Basophils, Absolute 0.00 10*3/mm3      nRBC 0.1 /100 WBC                I reviewed the patient's new clinical results.    History reviewed. No pertinent past medical history.  No past surgical history on file.      Medication Review:   Scheduled Meds:  amLODIPine 5 mg Oral Q12H   docusate sodium 100 mg Oral Daily   enoxaparin 40 mg Subcutaneous Daily   methylPREDNISolone sodium succinate 80 mg Intravenous Q8H   sodium chloride 10 mL Intravenous Q12H     Continuous Infusions:  Pharmacy to Dose enoxaparin (LOVENOX)    Pharmacy to Dose enoxaparin (LOVENOX)      PRN Meds:.•  acetaminophen  •  aluminum-magnesium hydroxide-simethicone  •  bisacodyl  •  HYDROcodone-acetaminophen  •  ketorolac  •  magnesium hydroxide  •  Morphine  •  ondansetron **OR** ondansetron  •  Pharmacy to Dose enoxaparin (LOVENOX)  •  Pharmacy to Dose enoxaparin (LOVENOX)  •  sodium chloride     Assessment/Plan     Principal Problem:    Herniation of intervertebral disc between L5 and S1----DR GARCIA  CONSULTED AND SUGGESTS SURGERY; CONSERVATIVE TREATMENT FOR NOW;  SURGERY  PLANNED FOR NEXT WEEK; WILL DO SHORT TERM DISABILITY AND FMLA  Active Problems:    Intractable back pain---IMPROVED; DISCHARGE ON 3 MORE DAYS OF KETOROLAC PO , A 10 DAY PREDNISONE WEAN, AND NORCO 10/325MG   #60    Steroid-induced hyperglycemia---NOT >200; PATIENT AWARE  LEUKOCYTOSIS ---DUE TO STEROIDS          Plan for disposition:HOME WITH SIGNIFICANT OTHER  SURGERY WITH DR GARCIA NEXT WEEK, PO KETOROLAC AND STEROID WEAN, AND PRN NORCO 10/325MG    NOT TO WORK    Abi Crowley MD  09/13/19  6:28 AM

## 2019-09-14 ENCOUNTER — READMISSION MANAGEMENT (OUTPATIENT)
Dept: CALL CENTER | Facility: HOSPITAL | Age: 34
End: 2019-09-14

## 2019-09-14 NOTE — OUTREACH NOTE
Prep Survey      Responses   Facility patient discharged from?  [facility]   Is patient eligible?  Yes   Discharge diagnosis  Herniation of interbertebral disc   Does the patient have one of the following disease processes/diagnoses(primary or secondary)?  Other   Does the patient have Home health ordered?  No   Is there a DME ordered?  No   Prep survey completed?  Yes          Adri Unger RN

## 2019-09-16 ENCOUNTER — READMISSION MANAGEMENT (OUTPATIENT)
Dept: CALL CENTER | Facility: HOSPITAL | Age: 34
End: 2019-09-16

## 2019-09-16 NOTE — OUTREACH NOTE
Medical Week 1 Survey      Responses   Facility patient discharged from?  [facility]   Does the patient have one of the following disease processes/diagnoses(primary or secondary)?  Other   Is there a successful TCM telephone encounter documented?  No   Week 1 attempt successful?  Yes   Call start time  1056   Call end time  1058   Discharge diagnosis  Herniation of interbertebral disc   Meds reviewed with patient/caregiver?  Yes   Is the patient having any side effects they believe may be caused by any medication additions or changes?  No   Does the patient have all medications ordered at discharge?  Yes   Is the patient taking all medications as directed (includes completed medication regime)?  Yes   Does the patient have a primary care provider?   Yes   Does the patient have an appointment with their PCP within 7 days of discharge?  Greater than 7 days   What is preventing the patient from scheduling follow up appointments within 7 days of discharge?  Earlier appointment not available   Comments  patient is waiting for surgeon to call him with an appt.   Has home health visited the patient within 72 hours of discharge?  N/A   Did the patient receive a copy of their discharge instructions?  Yes   Nursing interventions  Reviewed instructions with patient   What is the patient's perception of their health status since discharge?  Same   Is the patient/caregiver able to teach back signs and symptoms related to disease process for when to call PCP?  Yes   Is the patient/caregiver able to teach back signs and symptoms related to disease process for when to call 911?  Yes   Is the patient/caregiver able to teach back the hierarchy of who to call/visit for symptoms/problems? PCP, Specialist, Home health nurse, Urgent Care, ED, 911  Yes   Week 1 call completed?  Yes   Graduated  Yes   Did the patient feel the follow up calls were helpful during their recovery period?  Yes   Was the number of calls appropriate?  Yes    Graduated/Revoked comments  Waiting on surgeon's office to call him back.  No questions or needs at this time          Skylar Summers LPN

## 2019-09-17 ENCOUNTER — APPOINTMENT (OUTPATIENT)
Dept: PREADMISSION TESTING | Facility: HOSPITAL | Age: 34
End: 2019-09-17

## 2019-09-17 ENCOUNTER — HOSPITAL ENCOUNTER (OUTPATIENT)
Dept: GENERAL RADIOLOGY | Facility: HOSPITAL | Age: 34
Discharge: HOME | End: 2019-09-17
Admitting: NEUROLOGICAL SURGERY

## 2019-09-17 VITALS
HEIGHT: 69 IN | DIASTOLIC BLOOD PRESSURE: 81 MMHG | SYSTOLIC BLOOD PRESSURE: 132 MMHG | HEART RATE: 73 BPM | WEIGHT: 252.38 LBS | BODY MASS INDEX: 37.38 KG/M2 | OXYGEN SATURATION: 96 %

## 2019-09-17 LAB
ABO GROUP BLD: NORMAL
ALBUMIN SERPL-MCNC: 3.5 G/DL (ref 3.5–4.8)
ALBUMIN/GLOB SERPL: 1.4 G/DL (ref 1–1.7)
ALP SERPL-CCNC: 45 U/L (ref 32–91)
ALT SERPL W P-5'-P-CCNC: 97 U/L (ref 17–63)
ANION GAP SERPL CALCULATED.3IONS-SCNC: 11.6 MMOL/L (ref 5–15)
APTT PPP: 22.5 SECONDS (ref 24–31)
AST SERPL-CCNC: 38 U/L (ref 15–41)
BILIRUB SERPL-MCNC: 0.6 MG/DL (ref 0.3–1.2)
BILIRUB UR QL STRIP: NEGATIVE
BLD GP AB SCN SERPL QL: NEGATIVE
BUN BLD-MCNC: 13 MG/DL (ref 8–20)
BUN/CREAT SERPL: 16.3 (ref 6.2–20.3)
CALCIUM SPEC-SCNC: 8.9 MG/DL (ref 8.9–10.3)
CHLORIDE SERPL-SCNC: 100 MMOL/L (ref 101–111)
CLARITY UR: CLEAR
CO2 SERPL-SCNC: 32 MMOL/L (ref 22–32)
COLOR UR: YELLOW
CREAT BLD-MCNC: 0.8 MG/DL (ref 0.7–1.2)
GFR SERPL CREATININE-BSD FRML MDRD: 112 ML/MIN/1.73
GLOBULIN UR ELPH-MCNC: 2.5 GM/DL (ref 2.5–3.8)
GLUCOSE BLD-MCNC: 94 MG/DL (ref 65–99)
GLUCOSE UR STRIP-MCNC: NEGATIVE MG/DL
HGB UR QL STRIP.AUTO: NEGATIVE
INR PPP: 0.96 (ref 0.9–1.1)
KETONES UR QL STRIP: NEGATIVE
LEUKOCYTE ESTERASE UR QL STRIP.AUTO: NEGATIVE
NITRITE UR QL STRIP: NEGATIVE
PH UR STRIP.AUTO: 6 [PH] (ref 5–8)
POTASSIUM BLD-SCNC: 3.6 MMOL/L (ref 3.6–5.1)
PROT SERPL-MCNC: 6 G/DL (ref 6.1–7.9)
PROT UR QL STRIP: NEGATIVE
PROTHROMBIN TIME: 9.9 SECONDS (ref 9.6–11.7)
RH BLD: POSITIVE
SODIUM BLD-SCNC: 140 MMOL/L (ref 136–144)
SP GR UR STRIP: 1.02 (ref 1–1.03)
T&S EXPIRATION DATE: NORMAL
UROBILINOGEN UR QL STRIP: NORMAL

## 2019-09-17 PROCEDURE — 86900 BLOOD TYPING SEROLOGIC ABO: CPT

## 2019-09-17 PROCEDURE — 86850 RBC ANTIBODY SCREEN: CPT | Performed by: NEUROLOGICAL SURGERY

## 2019-09-17 PROCEDURE — 85610 PROTHROMBIN TIME: CPT | Performed by: NEUROLOGICAL SURGERY

## 2019-09-17 PROCEDURE — 81003 URINALYSIS AUTO W/O SCOPE: CPT | Performed by: NEUROLOGICAL SURGERY

## 2019-09-17 PROCEDURE — 86901 BLOOD TYPING SEROLOGIC RH(D): CPT

## 2019-09-17 PROCEDURE — 80053 COMPREHEN METABOLIC PANEL: CPT | Performed by: NEUROLOGICAL SURGERY

## 2019-09-17 PROCEDURE — 87081 CULTURE SCREEN ONLY: CPT | Performed by: NEUROLOGICAL SURGERY

## 2019-09-17 PROCEDURE — 36415 COLL VENOUS BLD VENIPUNCTURE: CPT

## 2019-09-17 PROCEDURE — 93005 ELECTROCARDIOGRAM TRACING: CPT

## 2019-09-17 PROCEDURE — 86900 BLOOD TYPING SEROLOGIC ABO: CPT | Performed by: NEUROLOGICAL SURGERY

## 2019-09-17 PROCEDURE — 71046 X-RAY EXAM CHEST 2 VIEWS: CPT

## 2019-09-17 PROCEDURE — 86901 BLOOD TYPING SEROLOGIC RH(D): CPT | Performed by: NEUROLOGICAL SURGERY

## 2019-09-17 PROCEDURE — 85730 THROMBOPLASTIN TIME PARTIAL: CPT | Performed by: NEUROLOGICAL SURGERY

## 2019-09-17 RX ORDER — KETOROLAC TROMETHAMINE 10 MG/1
10 TABLET, FILM COATED ORAL 3 TIMES DAILY PRN
COMMUNITY
End: 2019-09-19 | Stop reason: HOSPADM

## 2019-09-18 ENCOUNTER — TELEPHONE (OUTPATIENT)
Dept: PREADMISSION TESTING | Facility: HOSPITAL | Age: 34
End: 2019-09-18

## 2019-09-18 ENCOUNTER — ANESTHESIA EVENT (OUTPATIENT)
Dept: PERIOP | Facility: HOSPITAL | Age: 34
End: 2019-09-18

## 2019-09-18 LAB — MRSA SPEC QL CULT: NORMAL

## 2019-09-18 PROCEDURE — 93010 ELECTROCARDIOGRAM REPORT: CPT | Performed by: INTERNAL MEDICINE

## 2019-09-19 ENCOUNTER — ANESTHESIA (OUTPATIENT)
Dept: PERIOP | Facility: HOSPITAL | Age: 34
End: 2019-09-19

## 2019-09-19 ENCOUNTER — HOSPITAL ENCOUNTER (OUTPATIENT)
Facility: HOSPITAL | Age: 34
Setting detail: HOSPITAL OUTPATIENT SURGERY
Discharge: HOME | End: 2019-09-19
Attending: NEUROLOGICAL SURGERY | Admitting: NEUROLOGICAL SURGERY

## 2019-09-19 ENCOUNTER — APPOINTMENT (OUTPATIENT)
Dept: GENERAL RADIOLOGY | Facility: HOSPITAL | Age: 34
End: 2019-09-19

## 2019-09-19 VITALS
RESPIRATION RATE: 16 BRPM | HEIGHT: 69 IN | TEMPERATURE: 97.1 F | WEIGHT: 247.8 LBS | HEART RATE: 70 BPM | DIASTOLIC BLOOD PRESSURE: 85 MMHG | BODY MASS INDEX: 36.7 KG/M2 | SYSTOLIC BLOOD PRESSURE: 124 MMHG | OXYGEN SATURATION: 92 %

## 2019-09-19 PROBLEM — M51.27 HERNIATION OF INTERVERTEBRAL DISC BETWEEN L5 AND S1: Status: RESOLVED | Noted: 2019-09-12 | Resolved: 2019-09-19

## 2019-09-19 PROBLEM — R73.9 STEROID-INDUCED HYPERGLYCEMIA: Status: RESOLVED | Noted: 2019-09-12 | Resolved: 2019-09-19

## 2019-09-19 PROBLEM — T38.0X5A STEROID-INDUCED HYPERGLYCEMIA: Status: RESOLVED | Noted: 2019-09-12 | Resolved: 2019-09-19

## 2019-09-19 PROBLEM — M54.9 INTRACTABLE BACK PAIN: Status: RESOLVED | Noted: 2019-09-11 | Resolved: 2019-09-19

## 2019-09-19 PROCEDURE — 25010000002 PROPOFOL 200 MG/20ML EMULSION: Performed by: ANESTHESIOLOGY

## 2019-09-19 PROCEDURE — 25010000002 MORPHINE PER 10 MG: Performed by: ANESTHESIOLOGY

## 2019-09-19 PROCEDURE — 72020 X-RAY EXAM OF SPINE 1 VIEW: CPT

## 2019-09-19 PROCEDURE — 25010000002 HYDROMORPHONE PER 4 MG: Performed by: ANESTHESIOLOGY

## 2019-09-19 PROCEDURE — 25010000002 MIDAZOLAM PER 1 MG: Performed by: ANESTHESIOLOGY

## 2019-09-19 PROCEDURE — 25010000002 FENTANYL CITRATE (PF) 250 MCG/5ML SOLUTION: Performed by: ANESTHESIOLOGY

## 2019-09-19 PROCEDURE — 25010000002 ONDANSETRON PER 1 MG: Performed by: ANESTHESIOLOGY

## 2019-09-19 PROCEDURE — 76000 FLUOROSCOPY <1 HR PHYS/QHP: CPT

## 2019-09-19 PROCEDURE — 25010000002 DEXAMETHASONE PER 1 MG: Performed by: ANESTHESIOLOGY

## 2019-09-19 RX ORDER — DEXAMETHASONE SODIUM PHOSPHATE 4 MG/ML
INJECTION, SOLUTION INTRA-ARTICULAR; INTRALESIONAL; INTRAMUSCULAR; INTRAVENOUS; SOFT TISSUE AS NEEDED
Status: DISCONTINUED | OUTPATIENT
Start: 2019-09-19 | End: 2019-09-19 | Stop reason: SURG

## 2019-09-19 RX ORDER — NEOSTIGMINE METHYLSULFATE 5 MG/5 ML
SYRINGE (ML) INTRAVENOUS AS NEEDED
Status: DISCONTINUED | OUTPATIENT
Start: 2019-09-19 | End: 2019-09-19 | Stop reason: SURG

## 2019-09-19 RX ORDER — SODIUM CHLORIDE 0.9 % (FLUSH) 0.9 %
3 SYRINGE (ML) INJECTION EVERY 12 HOURS SCHEDULED
Status: DISCONTINUED | OUTPATIENT
Start: 2019-09-19 | End: 2019-09-19 | Stop reason: HOSPADM

## 2019-09-19 RX ORDER — SODIUM CHLORIDE 0.9 % (FLUSH) 0.9 %
3-10 SYRINGE (ML) INJECTION AS NEEDED
Status: DISCONTINUED | OUTPATIENT
Start: 2019-09-19 | End: 2019-09-19 | Stop reason: HOSPADM

## 2019-09-19 RX ORDER — KETAMINE HYDROCHLORIDE 10 MG/ML
INJECTION INTRAMUSCULAR; INTRAVENOUS AS NEEDED
Status: DISCONTINUED | OUTPATIENT
Start: 2019-09-19 | End: 2019-09-19 | Stop reason: SURG

## 2019-09-19 RX ORDER — SODIUM CHLORIDE, SODIUM LACTATE, POTASSIUM CHLORIDE, CALCIUM CHLORIDE 600; 310; 30; 20 MG/100ML; MG/100ML; MG/100ML; MG/100ML
INJECTION, SOLUTION INTRAVENOUS CONTINUOUS PRN
Status: DISCONTINUED | OUTPATIENT
Start: 2019-09-19 | End: 2019-09-19 | Stop reason: SURG

## 2019-09-19 RX ORDER — MIDAZOLAM HYDROCHLORIDE 1 MG/ML
INJECTION INTRAMUSCULAR; INTRAVENOUS AS NEEDED
Status: DISCONTINUED | OUTPATIENT
Start: 2019-09-19 | End: 2019-09-19 | Stop reason: SURG

## 2019-09-19 RX ORDER — GLYCOPYRROLATE 0.2 MG/ML
INJECTION INTRAMUSCULAR; INTRAVENOUS AS NEEDED
Status: DISCONTINUED | OUTPATIENT
Start: 2019-09-19 | End: 2019-09-19 | Stop reason: SURG

## 2019-09-19 RX ORDER — ROCURONIUM BROMIDE 10 MG/ML
INJECTION, SOLUTION INTRAVENOUS AS NEEDED
Status: DISCONTINUED | OUTPATIENT
Start: 2019-09-19 | End: 2019-09-19 | Stop reason: SURG

## 2019-09-19 RX ORDER — SODIUM CHLORIDE 9 MG/ML
9 INJECTION, SOLUTION INTRAVENOUS CONTINUOUS PRN
Status: DISCONTINUED | OUTPATIENT
Start: 2019-09-19 | End: 2019-09-19 | Stop reason: HOSPADM

## 2019-09-19 RX ORDER — EPHEDRINE SULFATE 50 MG/ML
INJECTION INTRAVENOUS AS NEEDED
Status: DISCONTINUED | OUTPATIENT
Start: 2019-09-19 | End: 2019-09-19 | Stop reason: SURG

## 2019-09-19 RX ORDER — GABAPENTIN 300 MG/1
900 CAPSULE ORAL ONCE
Status: COMPLETED | OUTPATIENT
Start: 2019-09-19 | End: 2019-09-19

## 2019-09-19 RX ORDER — PROMETHAZINE HYDROCHLORIDE 25 MG/1
25 SUPPOSITORY RECTAL ONCE AS NEEDED
Status: DISCONTINUED | OUTPATIENT
Start: 2019-09-19 | End: 2019-09-19 | Stop reason: HOSPADM

## 2019-09-19 RX ORDER — ONDANSETRON 2 MG/ML
INJECTION INTRAMUSCULAR; INTRAVENOUS AS NEEDED
Status: DISCONTINUED | OUTPATIENT
Start: 2019-09-19 | End: 2019-09-19 | Stop reason: SURG

## 2019-09-19 RX ORDER — MORPHINE SULFATE 4 MG/ML
2 INJECTION, SOLUTION INTRAMUSCULAR; INTRAVENOUS
Status: DISCONTINUED | OUTPATIENT
Start: 2019-09-19 | End: 2019-09-19 | Stop reason: HOSPADM

## 2019-09-19 RX ORDER — ONDANSETRON 2 MG/ML
4 INJECTION INTRAMUSCULAR; INTRAVENOUS ONCE AS NEEDED
Status: DISCONTINUED | OUTPATIENT
Start: 2019-09-19 | End: 2019-09-19 | Stop reason: HOSPADM

## 2019-09-19 RX ORDER — BUPIVACAINE HYDROCHLORIDE AND EPINEPHRINE 2.5; 5 MG/ML; UG/ML
INJECTION, SOLUTION EPIDURAL; INFILTRATION; INTRACAUDAL; PERINEURAL AS NEEDED
Status: DISCONTINUED | OUTPATIENT
Start: 2019-09-19 | End: 2019-09-19 | Stop reason: HOSPADM

## 2019-09-19 RX ORDER — PROMETHAZINE HYDROCHLORIDE 25 MG/ML
12.5 INJECTION, SOLUTION INTRAMUSCULAR; INTRAVENOUS ONCE AS NEEDED
Status: DISCONTINUED | OUTPATIENT
Start: 2019-09-19 | End: 2019-09-19 | Stop reason: HOSPADM

## 2019-09-19 RX ORDER — PROPOFOL 10 MG/ML
INJECTION, EMULSION INTRAVENOUS AS NEEDED
Status: DISCONTINUED | OUTPATIENT
Start: 2019-09-19 | End: 2019-09-19 | Stop reason: SURG

## 2019-09-19 RX ORDER — PHENYLEPHRINE HYDROCHLORIDE 10 MG/ML
INJECTION INTRAVENOUS AS NEEDED
Status: DISCONTINUED | OUTPATIENT
Start: 2019-09-19 | End: 2019-09-19

## 2019-09-19 RX ORDER — PROMETHAZINE HYDROCHLORIDE 25 MG/1
25 TABLET ORAL ONCE AS NEEDED
Status: DISCONTINUED | OUTPATIENT
Start: 2019-09-19 | End: 2019-09-19 | Stop reason: HOSPADM

## 2019-09-19 RX ORDER — FENTANYL CITRATE 50 UG/ML
INJECTION, SOLUTION INTRAMUSCULAR; INTRAVENOUS AS NEEDED
Status: DISCONTINUED | OUTPATIENT
Start: 2019-09-19 | End: 2019-09-19 | Stop reason: SURG

## 2019-09-19 RX ORDER — PHENYLEPHRINE HCL IN 0.9% NACL 0.5 MG/5ML
SYRINGE (ML) INTRAVENOUS AS NEEDED
Status: DISCONTINUED | OUTPATIENT
Start: 2019-09-19 | End: 2019-09-19 | Stop reason: SURG

## 2019-09-19 RX ORDER — HYDROMORPHONE HCL 110MG/55ML
PATIENT CONTROLLED ANALGESIA SYRINGE INTRAVENOUS AS NEEDED
Status: DISCONTINUED | OUTPATIENT
Start: 2019-09-19 | End: 2019-09-19 | Stop reason: SURG

## 2019-09-19 RX ADMIN — EPHEDRINE SULFATE 5 MG: 50 INJECTION, SOLUTION INTRAVENOUS at 09:17

## 2019-09-19 RX ADMIN — ONDANSETRON 4 MG: 2 INJECTION INTRAMUSCULAR; INTRAVENOUS at 09:30

## 2019-09-19 RX ADMIN — PROPOFOL 300 MG: 10 INJECTION, EMULSION INTRAVENOUS at 08:17

## 2019-09-19 RX ADMIN — PHENYLEPHRINE HYDROCHLORIDE 100 MCG: 10 INJECTION INTRAVENOUS at 09:17

## 2019-09-19 RX ADMIN — GLYCOPYRROLATE 0.4 MG: 0.2 INJECTION, SOLUTION INTRAMUSCULAR; INTRAVENOUS at 09:37

## 2019-09-19 RX ADMIN — KETAMINE HYDROCHLORIDE 20 MG: 10 INJECTION INTRAMUSCULAR; INTRAVENOUS at 08:58

## 2019-09-19 RX ADMIN — MIDAZOLAM 2 MG: 1 INJECTION INTRAMUSCULAR; INTRAVENOUS at 08:17

## 2019-09-19 RX ADMIN — HYDROMORPHONE HYDROCHLORIDE 1 MG: 2 INJECTION INTRAMUSCULAR; INTRAVENOUS; SUBCUTANEOUS at 09:30

## 2019-09-19 RX ADMIN — Medication 2 MG: at 09:37

## 2019-09-19 RX ADMIN — MORPHINE SULFATE 2 MG: 4 INJECTION INTRAVENOUS at 10:51

## 2019-09-19 RX ADMIN — GLYCOPYRROLATE 0.2 MG: 0.2 INJECTION, SOLUTION INTRAMUSCULAR; INTRAVENOUS at 09:45

## 2019-09-19 RX ADMIN — Medication 1 MG: at 09:45

## 2019-09-19 RX ADMIN — FENTANYL CITRATE 100 MCG: 50 INJECTION, SOLUTION INTRAMUSCULAR; INTRAVENOUS at 08:17

## 2019-09-19 RX ADMIN — DEXAMETHASONE SODIUM PHOSPHATE 8 MG: 4 INJECTION, SOLUTION INTRAMUSCULAR; INTRAVENOUS at 08:28

## 2019-09-19 RX ADMIN — EPHEDRINE SULFATE 5 MG: 50 INJECTION, SOLUTION INTRAVENOUS at 08:57

## 2019-09-19 RX ADMIN — CEFAZOLIN SODIUM 2 G: 1 INJECTION, POWDER, FOR SOLUTION INTRAMUSCULAR; INTRAVENOUS at 08:21

## 2019-09-19 RX ADMIN — GABAPENTIN 900 MG: 300 CAPSULE ORAL at 07:07

## 2019-09-19 RX ADMIN — MORPHINE SULFATE 2 MG: 4 INJECTION INTRAVENOUS at 10:37

## 2019-09-19 RX ADMIN — ROCURONIUM BROMIDE 50 MG: 10 INJECTION, SOLUTION INTRAVENOUS at 08:18

## 2019-09-19 RX ADMIN — HYDROMORPHONE HYDROCHLORIDE 1 MG: 2 INJECTION INTRAMUSCULAR; INTRAVENOUS; SUBCUTANEOUS at 09:45

## 2019-09-19 RX ADMIN — PHENYLEPHRINE HYDROCHLORIDE 100 MCG: 10 INJECTION INTRAVENOUS at 08:47

## 2019-09-19 RX ADMIN — PHENYLEPHRINE HYDROCHLORIDE 100 MCG: 10 INJECTION INTRAVENOUS at 08:55

## 2019-09-19 RX ADMIN — PHENYLEPHRINE HYDROCHLORIDE 100 MCG: 10 INJECTION INTRAVENOUS at 09:05

## 2019-09-19 RX ADMIN — SODIUM CHLORIDE 9 ML/HR: 900 INJECTION, SOLUTION INTRAVENOUS at 06:41

## 2019-09-19 RX ADMIN — SODIUM CHLORIDE, SODIUM LACTATE, POTASSIUM CHLORIDE, AND CALCIUM CHLORIDE: .6; .31; .03; .02 INJECTION, SOLUTION INTRAVENOUS at 09:46

## 2019-09-19 RX ADMIN — EPHEDRINE SULFATE 5 MG: 50 INJECTION, SOLUTION INTRAVENOUS at 08:50

## 2019-09-19 NOTE — OP NOTE
Preop diagnosis: Right L5-S1 disc herniation with severe stenosis and severe right S1 radiculopathy    Postop: Same    Procedure: Minimally invasive right L5-S1 laminotomy foraminotomy and discectomy using the Zuberance system    Surgeon: Richard    Assistant: OR staff    Blood loss: Less than 100 cc    Specimen: None    Complications: None    Drains: None    Indications for procedure: This man's had severe progressive back hip and leg pain with physical findings of an S1 radiculopathy.  His MRI shows a massive disc herniation causing severe stenosis and impingement of the right S1 nerve root.  After extensive counseling in the hospital he elected to proceed with surgical decompression    Scription of procedure: Under general endotracheal anesthesia the patient was positioned prepped and draped in usual fashion.  Fluoroscopy was used to determine incision site and trajectory to the right L5-S1 interspace.  A skin incision was made and carried down to the fascia.  Fascia was incised and paraspinous muscles were split down to the L5-S1 interspace.  Progressively larger trochars followed by the operating port were placed.  Intraoperative fluoroscopy confirmed positioning at L5-S1.  High-speed drill was used to thin the right L5 hemilamina and to remove a portion of the medial facet on the right at L5-S1.  Care was taken to avoid destabilization of the facet joint.  Kerrison punch was used to fashion a wide laminotomy.  The lateral recess was decompressed laterally after the pedicle and the nerve root unroofed in its foramen via wide foraminotomies.  There was lateral recess and foraminal stenosis that was nicely decompressed.  The nerve root was gently retracted with a nerve root retractor.  There is a very large subligamentous disc herniation.  The annulus was incised with 11 blade scalpel and disc was removed from the disc space using a combination of pituitary forceps and curettes.  A down-biting curette was  used to deliver medial superior lateral and inferior disc into the field where it was removed with pituitary forceps.  All loose and free fragments were removed from the disc space.  The epidural space was explored with a blunt probe.  There was a massive fragment of disc in the axilla of the nerve root this was milked into the field and removed with a micropituitary forceps.  Following this additional exploration was carried out.  There were no further fragments of disc or bone.  The nerve root was widely free in its foramen.  An excellent decompression was achieved.  The disc and entire wound was thoroughly irrigated with copious antibiotic solution.  The field was dry.  Incision was closed in layers using resorbable suture.  Patient tolerated procedure well returned to recovery room stable condition

## 2019-09-19 NOTE — ANESTHESIA PREPROCEDURE EVALUATION
Anesthesia Evaluation     Patient summary reviewed and Nursing notes reviewed   NPO Solid Status: > 8 hours  NPO Liquid Status: > 8 hours           Airway   Mallampati: I  TM distance: >3 FB  Neck ROM: full  No difficulty expected  Dental - normal exam     Pulmonary - negative pulmonary ROS and normal exam   Cardiovascular - negative cardio ROS and normal exam        Neuro/Psych- negative ROS  GI/Hepatic/Renal/Endo    (+) obesity,       Musculoskeletal     (+) back pain,   Abdominal  - normal exam    Bowel sounds: normal.   Substance History - negative use     OB/GYN negative ob/gyn ROS         Other                        Anesthesia Plan    ASA 2     general     intravenous induction   Anesthetic plan, all risks, benefits, and alternatives have been provided, discussed and informed consent has been obtained with: patient.

## 2019-09-19 NOTE — DISCHARGE INSTRUCTIONS
Patient is to refrain from strenuous activity.  He is to follow-up in my office in 3 weeks and call with any questions or problems

## 2019-09-19 NOTE — ANESTHESIA POSTPROCEDURE EVALUATION
Patient: Bo Kay    Procedure Summary     Date:  09/19/19 Room / Location:  [facility] OR [address] [place] [address] MAIN OR    Anesthesia Start:  0805 Anesthesia Stop:  1001    Procedure:  RIGHT L5/S1 LAMINOTOMY FORAMINOTOMY AND DISCECTOMY W METRX (Right Spine Lumbar) Diagnosis:  (LUMBAR DISC HERNIATION)    Surgeon:  Myke Ramos MD Provider:  Adalberto Tay MD    Anesthesia Type:  general ASA Status:  2          Anesthesia Type: general  Last vitals  BP   114/72 (09/19/19 1055)   Temp   97.8 °F (36.6 °C) (09/19/19 1055)   Pulse   82 (09/19/19 1055)   Resp   15 (09/19/19 1055)     SpO2   92 % (09/19/19 1055)     Post Anesthesia Care and Evaluation    Patient location during evaluation: bedside  Patient participation: complete - patient cannot participate  Level of consciousness: awake  Pain score: 0  Pain management: adequate  Airway patency: patent  Anesthetic complications: No anesthetic complications  PONV Status: none  Cardiovascular status: acceptable  Respiratory status: acceptable  Hydration status: acceptable

## 2021-02-03 PROCEDURE — U0003 INFECTIOUS AGENT DETECTION BY NUCLEIC ACID (DNA OR RNA); SEVERE ACUTE RESPIRATORY SYNDROME CORONAVIRUS 2 (SARS-COV-2) (CORONAVIRUS DISEASE [COVID-19]), AMPLIFIED PROBE TECHNIQUE, MAKING USE OF HIGH THROUGHPUT TECHNOLOGIES AS DESCRIBED BY CMS-2020-01-R: HCPCS | Performed by: FAMILY MEDICINE

## 2022-09-14 NOTE — PROGRESS NOTES
"DATE/TIME OF ADMISSION:  9/11/2019  8:15 PM     LOS: 1 day   Patient Care Team:  Abi Crowley MD as PCP - General (Family Medicine)  Consults     Date and Time Order Name Status Description    9/12/2019 2038 Inpatient Neurosurgery Consult            Subjective PAIN IS BETTER; ABLE TO WALK SOME TO THE BATHROOM, TO THE SCALES TONIGHT  TRYING TO USE ONLY THE TORADOL AND PO NORCO IF NEEDED;  STILL WITH THE NUMBNESS TO THE RIGHT FOOT AT THE ARCH TO THE HEEL. NO FOOT DROP NOTED WHEN AMBULATING    Interval History: SEE H AND P    Patient Complaints: STILL WITH PAIN BUT MARKEDLY BETTER THAN YESTERDAY    History taken from: patient    Review of Systems HAD A GOOD BM TODAY  STILL WITH RIGHT ARCH TO HEEL NUMBNESS       Objective     Vital Signs  /96 (BP Location: Left arm, Patient Position: Sitting)   Pulse 63   Temp 98 °F (36.7 °C) (Oral)   Resp 16   Ht 175.3 cm (69\")   Wt 107 kg (236 lb 12.4 oz)   SpO2 98%   BMI 34.97 kg/m²     Physical Exam:     General Appearance:    Alert, cooperative, in no acute distress tonight   Head:    Normocephalic, without obvious abnormality, atraumatic   Eyes:            Lids and lashes normal, conjunctivae and sclerae normal, no   icterus, no pallor, corneas clear, PERRLA   Ears:    Ears appear intact with no abnormalities noted   Throat:   No oral lesions, no thrush, oral mucosa moist   Neck:   No adenopathy, supple, trachea midline, no thyromegaly, no   carotid bruit, no JVD   Lungs:     Clear to auscultation,respirations regular, even and                  unlabored    Heart:    Regular rhythm and normal rate, normal S1 and S2, no            murmur, no gallop, no rub, no click   Chest Wall:    No abnormalities observed   Abdomen:     Normal bowel sounds, no masses, no organomegaly, soft        non-tender, non-distended, no guarding, no rebound                tenderness   Extremities:   Moves all extremities well, no edema, no cyanosis, no             Redness; good strong great " toe extensor strength; no calf tenderness   Pulses:   Pulses palpable and equal bilaterally   Skin:   No bleeding, bruising or rash   Lymph nodes:   No palpable adenopathy   Neurologic:   Grossly normal, alert and O x 3        I HAVE PERSONALLY EXAMINED AND REVIEWED THE PATIENT'S RECORD     Lab Results (last 48 hours)     Procedure Component Value Units Date/Time    POC Glucose Once [[id number]]  (Abnormal) Collected:  09/12/19 2042    Specimen:  Blood Updated:  09/12/19 2044     Glucose 194 mg/dL      Comment: Serial Number: [id number]:  [id number]       Hemoglobin A1c [[id number]]  (Normal) Collected:  09/12/19 0714    Specimen:  Blood Updated:  09/12/19 1342     Hemoglobin A1C 5.2 %     Narrative:       Hemoglobin A1C Reference Range:    <5.7 %        Normal  5.7-6.4 %     Increased risk for diabetes  > 6.4 %        Diabetes       These guidelines have been recommended by the American Diabetic Association for Hgb A1c.      The following 2010 guidelines have been recommended by the American Diabetes Association for Hemoglobin A1c.    HBA1c 5.7-6.4% Increased risk for future diabetes (pre-diabetes)  HBA1c     >6.4% Diabetes    Comprehensive Metabolic Panel [[id number]]  (Abnormal) Collected:  09/12/19 0714    Specimen:  Blood Updated:  09/12/19 0839     Glucose 127 mg/dL      BUN 11 mg/dL      Creatinine 0.80 mg/dL      Sodium 140 mmol/L      Potassium 4.6 mmol/L      Chloride 103 mmol/L      CO2 28.0 mmol/L      Calcium 9.3 mg/dL      Total Protein 6.1 g/dL      Albumin 3.80 g/dL      ALT (SGPT) 81 U/L      AST (SGOT) 52 U/L      Alkaline Phosphatase 50 U/L      Total Bilirubin 0.5 mg/dL      eGFR Non African Amer 112 mL/min/1.73      Globulin 2.3 gm/dL      A/G Ratio 1.7 g/dL      BUN/Creatinine Ratio 13.8     Anion Gap 13.6 mmol/L     CBC & Differential [[id number]] Collected:  09/12/19 0714    Specimen:  Blood Updated:  09/12/19 0807    Narrative:       The following orders were created for panel order CBC &  Differential.  Procedure                               Abnormality         Status                     ---------                               -----------         ------                     CBC Auto Differential[[id number]]        Abnormal            Final result                 Please view results for these tests on the individual orders.    CBC Auto Differential [[id number]]  (Abnormal) Collected:  09/12/19 0714    Specimen:  Blood Updated:  09/12/19 0807     WBC 11.10 10*3/mm3      RBC 4.87 10*6/mm3      Hemoglobin 15.0 g/dL      Hematocrit 45.2 %      MCV 92.9 fL      MCH 30.9 pg      MCHC 33.2 g/dL      RDW 13.7 %      RDW-SD 45.1 fl      MPV 9.7 fL      Platelets 236 10*3/mm3      Neutrophil % 86.0 %      Lymphocyte % 11.2 %      Monocyte % 2.6 %      Eosinophil % 0.0 %      Basophil % 0.2 %      Neutrophils, Absolute 9.50 10*3/mm3      Lymphocytes, Absolute 1.20 10*3/mm3      Monocytes, Absolute 0.30 10*3/mm3      Eosinophils, Absolute 0.00 10*3/mm3      Basophils, Absolute 0.00 10*3/mm3      nRBC 0.1 /100 WBC     Comprehensive Metabolic Panel [[id number]]  (Abnormal) Collected:  09/11/19 2128    Specimen:  Blood Updated:  09/11/19 2209     Glucose 154 mg/dL      BUN 10 mg/dL      Creatinine 0.90 mg/dL      Sodium 140 mmol/L      Potassium 3.2 mmol/L      Chloride 101 mmol/L      CO2 26.0 mmol/L      Calcium 8.8 mg/dL      Total Protein 6.1 g/dL      Albumin 3.50 g/dL      ALT (SGPT) 67 U/L      AST (SGOT) 47 U/L      Alkaline Phosphatase 45 U/L      Total Bilirubin 0.6 mg/dL      eGFR Non African Amer 98 mL/min/1.73      Globulin 2.6 gm/dL      A/G Ratio 1.3 g/dL      BUN/Creatinine Ratio 11.1     Anion Gap 16.2 mmol/L     CBC & Differential [[id number]] Collected:  09/11/19 2129    Specimen:  Blood Updated:  09/11/19 2150    Narrative:       The following orders were created for panel order CBC & Differential.  Procedure                               Abnormality         Status                     ---------                                -----------         ------                     CBC Auto Differential[[id number]]        Abnormal            Final result                 Please view results for these tests on the individual orders.    CBC Auto Differential [[id number]]  (Abnormal) Collected:  09/11/19 2129    Specimen:  Blood Updated:  09/11/19 2150     WBC 10.60 10*3/mm3      RBC 4.79 10*6/mm3      Hemoglobin 14.8 g/dL      Hematocrit 44.2 %      MCV 92.2 fL      MCH 30.9 pg      MCHC 33.5 g/dL      RDW 13.7 %      RDW-SD 44.2 fl      MPV 9.6 fL      Platelets 223 10*3/mm3      Neutrophil % 75.5 %      Lymphocyte % 19.9 %      Monocyte % 4.0 %      Eosinophil % 0.2 %      Basophil % 0.4 %      Neutrophils, Absolute 8.00 10*3/mm3      Lymphocytes, Absolute 2.10 10*3/mm3      Monocytes, Absolute 0.40 10*3/mm3      Eosinophils, Absolute 0.00 10*3/mm3      Basophils, Absolute 0.00 10*3/mm3      nRBC 0.1 /100 WBC            Lab Results (last 48 hours)     Procedure Component Value Units Date/Time    POC Glucose Once [[id number]]  (Abnormal) Collected:  09/12/19 2042    Specimen:  Blood Updated:  09/12/19 2044     Glucose 194 mg/dL      Comment: Serial Number: [id number]:  [id number]       Hemoglobin A1c [[id number]]  (Normal) Collected:  09/12/19 0714    Specimen:  Blood Updated:  09/12/19 1342     Hemoglobin A1C 5.2 %     Narrative:       Hemoglobin A1C Reference Range:    <5.7 %        Normal  5.7-6.4 %     Increased risk for diabetes  > 6.4 %        Diabetes       These guidelines have been recommended by the American Diabetic Association for Hgb A1c.      The following 2010 guidelines have been recommended by the American Diabetes Association for Hemoglobin A1c.    HBA1c 5.7-6.4% Increased risk for future diabetes (pre-diabetes)  HBA1c     >6.4% Diabetes    Comprehensive Metabolic Panel [[id number]]  (Abnormal) Collected:  09/12/19 0714    Specimen:  Blood Updated:  09/12/19 0839     Glucose 127 mg/dL      BUN 11 mg/dL       Creatinine 0.80 mg/dL      Sodium 140 mmol/L      Potassium 4.6 mmol/L      Chloride 103 mmol/L      CO2 28.0 mmol/L      Calcium 9.3 mg/dL      Total Protein 6.1 g/dL      Albumin 3.80 g/dL      ALT (SGPT) 81 U/L      AST (SGOT) 52 U/L      Alkaline Phosphatase 50 U/L      Total Bilirubin 0.5 mg/dL      eGFR Non African Amer 112 mL/min/1.73      Globulin 2.3 gm/dL      A/G Ratio 1.7 g/dL      BUN/Creatinine Ratio 13.8     Anion Gap 13.6 mmol/L     CBC & Differential [[id number]] Collected:  09/12/19 0714    Specimen:  Blood Updated:  09/12/19 0807    Narrative:       The following orders were created for panel order CBC & Differential.  Procedure                               Abnormality         Status                     ---------                               -----------         ------                     CBC Auto Differential[[id number]]        Abnormal            Final result                 Please view results for these tests on the individual orders.    CBC Auto Differential [[id number]]  (Abnormal) Collected:  09/12/19 0714    Specimen:  Blood Updated:  09/12/19 0807     WBC 11.10 10*3/mm3      RBC 4.87 10*6/mm3      Hemoglobin 15.0 g/dL      Hematocrit 45.2 %      MCV 92.9 fL      MCH 30.9 pg      MCHC 33.2 g/dL      RDW 13.7 %      RDW-SD 45.1 fl      MPV 9.7 fL      Platelets 236 10*3/mm3      Neutrophil % 86.0 %      Lymphocyte % 11.2 %      Monocyte % 2.6 %      Eosinophil % 0.0 %      Basophil % 0.2 %      Neutrophils, Absolute 9.50 10*3/mm3      Lymphocytes, Absolute 1.20 10*3/mm3      Monocytes, Absolute 0.30 10*3/mm3      Eosinophils, Absolute 0.00 10*3/mm3      Basophils, Absolute 0.00 10*3/mm3      nRBC 0.1 /100 WBC     Comprehensive Metabolic Panel [[id number]]  (Abnormal) Collected:  09/11/19 2128    Specimen:  Blood Updated:  09/11/19 2209     Glucose 154 mg/dL      BUN 10 mg/dL      Creatinine 0.90 mg/dL      Sodium 140 mmol/L      Potassium 3.2 mmol/L      Chloride 101 mmol/L      CO2 26.0 mmol/L       Calcium 8.8 mg/dL      Total Protein 6.1 g/dL      Albumin 3.50 g/dL      ALT (SGPT) 67 U/L      AST (SGOT) 47 U/L      Alkaline Phosphatase 45 U/L      Total Bilirubin 0.6 mg/dL      eGFR Non African Amer 98 mL/min/1.73      Globulin 2.6 gm/dL      A/G Ratio 1.3 g/dL      BUN/Creatinine Ratio 11.1     Anion Gap 16.2 mmol/L     CBC & Differential [[id number]] Collected:  09/11/19 2129    Specimen:  Blood Updated:  09/11/19 2150    Narrative:       The following orders were created for panel order CBC & Differential.  Procedure                               Abnormality         Status                     ---------                               -----------         ------                     CBC Auto Differential[[id number]]        Abnormal            Final result                 Please view results for these tests on the individual orders.    CBC Auto Differential [[id number]]  (Abnormal) Collected:  09/11/19 2129    Specimen:  Blood Updated:  09/11/19 2150     WBC 10.60 10*3/mm3      RBC 4.79 10*6/mm3      Hemoglobin 14.8 g/dL      Hematocrit 44.2 %      MCV 92.2 fL      MCH 30.9 pg      MCHC 33.5 g/dL      RDW 13.7 %      RDW-SD 44.2 fl      MPV 9.6 fL      Platelets 223 10*3/mm3      Neutrophil % 75.5 %      Lymphocyte % 19.9 %      Monocyte % 4.0 %      Eosinophil % 0.2 %      Basophil % 0.4 %      Neutrophils, Absolute 8.00 10*3/mm3      Lymphocytes, Absolute 2.10 10*3/mm3      Monocytes, Absolute 0.40 10*3/mm3      Eosinophils, Absolute 0.00 10*3/mm3      Basophils, Absolute 0.00 10*3/mm3      nRBC 0.1 /100 WBC                I reviewed the patient's new clinical results.    History reviewed. No pertinent past medical history.  No past surgical history on file.      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---DR GARCIA TO CONSULT; IV SOLUMEDROL; KETOROLAC; MS IF NEEDED; NORCO PO PRN; MRI ORDERED AND DONE; WILL STRIVE FOR CONSERVATIVE TREATMENT IF AT ALL POSSIBLE WITH OUTPT PT AND PREDNISONE TAPER; IF DOING WELL COULD CONSIDER DISCHARGE TOMORROW AFTER CONSULTATION WITH DR GARCIA  Active Problems:    Intractable back pain---NEW PROBLEM, ACUTE IN NATURE    Steroid-induced hyperglycemia---NORMAL HGB A1C; DISCUSSED LOW CARB DIET, EXERCISE WHEN ABLE , WEIGHT LOSS, AND AWARENESS OF INCREASED RISK OF ISSUES LATER IN LIFE; WILL CHECK Q AC AND Q HS FINGER STICK BLOOD SUGARS; TONIGHT, RANDOM  AFTER A NUTRIGRAIN BAR       Plan for disposition:POSSIBLY HOME TOMORROW AFTER CONSULT   WITH DR GARCIA , PROVIDED THAT DR GARCIA IS IN AGREEMENT WITH STEROID TAPER, OUTPATIENT PT AT [facility], AND FOLLOW UP WITH DR GARCIA AND ME LATER ON    Abi Crowley MD  09/12/19  9:15 PM           Patient expressed no known problems or needs

## 2023-01-20 ENCOUNTER — OFFICE VISIT (OUTPATIENT)
Dept: FAMILY MEDICINE CLINIC | Facility: CLINIC | Age: 38
End: 2023-01-20
Payer: COMMERCIAL

## 2023-01-20 VITALS
TEMPERATURE: 97.3 F | HEART RATE: 78 BPM | BODY MASS INDEX: 37.74 KG/M2 | WEIGHT: 254.8 LBS | DIASTOLIC BLOOD PRESSURE: 120 MMHG | HEIGHT: 69 IN | OXYGEN SATURATION: 94 % | SYSTOLIC BLOOD PRESSURE: 166 MMHG

## 2023-01-20 DIAGNOSIS — N53.19 ABNORMAL EJACULATION: ICD-10-CM

## 2023-01-20 DIAGNOSIS — Z13.220 SCREENING CHOLESTEROL LEVEL: ICD-10-CM

## 2023-01-20 DIAGNOSIS — E55.9 VITAMIN D DEFICIENCY: ICD-10-CM

## 2023-01-20 DIAGNOSIS — J01.91 ACUTE RECURRENT SINUSITIS, UNSPECIFIED LOCATION: ICD-10-CM

## 2023-01-20 DIAGNOSIS — I10 PRIMARY HYPERTENSION: ICD-10-CM

## 2023-01-20 DIAGNOSIS — Z00.01 ENCOUNTER FOR ANNUAL GENERAL MEDICAL EXAMINATION WITH ABNORMAL FINDINGS IN ADULT: ICD-10-CM

## 2023-01-20 DIAGNOSIS — F17.200 TOBACCO USE DISORDER: ICD-10-CM

## 2023-01-20 DIAGNOSIS — E66.09 CLASS 2 OBESITY DUE TO EXCESS CALORIES WITHOUT SERIOUS COMORBIDITY WITH BODY MASS INDEX (BMI) OF 37.0 TO 37.9 IN ADULT: ICD-10-CM

## 2023-01-20 DIAGNOSIS — Z11.4 SCREENING FOR HIV (HUMAN IMMUNODEFICIENCY VIRUS): ICD-10-CM

## 2023-01-20 DIAGNOSIS — Z11.59 ENCOUNTER FOR HEPATITIS C VIRUS SCREENING TEST FOR HIGH RISK PATIENT: ICD-10-CM

## 2023-01-20 DIAGNOSIS — Z91.89 ENCOUNTER FOR HEPATITIS C VIRUS SCREENING TEST FOR HIGH RISK PATIENT: ICD-10-CM

## 2023-01-20 DIAGNOSIS — R22.1 NECK MASS: ICD-10-CM

## 2023-01-20 DIAGNOSIS — Z13.1 SCREENING FOR DIABETES MELLITUS: ICD-10-CM

## 2023-01-20 DIAGNOSIS — T78.40XA ALLERGY, INITIAL ENCOUNTER: Primary | ICD-10-CM

## 2023-01-20 DIAGNOSIS — R06.83 SNORING: ICD-10-CM

## 2023-01-20 DIAGNOSIS — J01.81 OTHER ACUTE RECURRENT SINUSITIS: ICD-10-CM

## 2023-01-20 PROBLEM — N43.40 SPERMATOCELE: Status: ACTIVE | Noted: 2023-01-20

## 2023-01-20 PROBLEM — E66.01 MORBID (SEVERE) OBESITY DUE TO EXCESS CALORIES (HCC): Status: ACTIVE | Noted: 2023-01-20

## 2023-01-20 PROBLEM — Z80.0 FAMILY HISTORY OF COLON CANCER: Status: ACTIVE | Noted: 2023-01-20

## 2023-01-20 PROBLEM — E66.812 CLASS 2 OBESITY WITHOUT SERIOUS COMORBIDITY WITH BODY MASS INDEX (BMI) OF 37.0 TO 37.9 IN ADULT: Status: ACTIVE | Noted: 2023-01-20

## 2023-01-20 PROBLEM — Z98.890 HISTORY OF RIGHT INGUINAL HERNIA REPAIR: Status: ACTIVE | Noted: 2023-01-20

## 2023-01-20 PROBLEM — E66.9 CLASS 2 OBESITY WITHOUT SERIOUS COMORBIDITY WITH BODY MASS INDEX (BMI) OF 37.0 TO 37.9 IN ADULT: Status: ACTIVE | Noted: 2023-01-20

## 2023-01-20 PROBLEM — Z87.19 HISTORY OF RIGHT INGUINAL HERNIA REPAIR: Status: ACTIVE | Noted: 2023-01-20

## 2023-01-20 LAB
EXPIRATION DATE: NORMAL
FLUAV AG UPPER RESP QL IA.RAPID: NOT DETECTED
FLUBV AG UPPER RESP QL IA.RAPID: NOT DETECTED
INTERNAL CONTROL: NORMAL
Lab: NORMAL
SARS-COV-2 AG UPPER RESP QL IA.RAPID: NOT DETECTED

## 2023-01-20 PROCEDURE — 99395 PREV VISIT EST AGE 18-39: CPT | Performed by: PREVENTIVE MEDICINE

## 2023-01-20 PROCEDURE — 87428 SARSCOV & INF VIR A&B AG IA: CPT | Performed by: PREVENTIVE MEDICINE

## 2023-01-20 PROCEDURE — 99213 OFFICE O/P EST LOW 20 MIN: CPT | Performed by: PREVENTIVE MEDICINE

## 2023-01-20 RX ORDER — FLUTICASONE PROPIONATE 50 MCG
2 SPRAY, SUSPENSION (ML) NASAL DAILY
Qty: 1 ML | Refills: 3 | Status: SHIPPED | OUTPATIENT
Start: 2023-01-20

## 2023-01-20 RX ORDER — AZITHROMYCIN 250 MG/1
TABLET, FILM COATED ORAL
Qty: 6 TABLET | Refills: 0 | Status: SHIPPED | OUTPATIENT
Start: 2023-01-20 | End: 2023-02-06

## 2023-01-20 NOTE — PROGRESS NOTES
Subjective   Bo Kay is a 37 y.o. male presents for   Chief Complaint   Patient presents with   • Establish Care   • knot on neck     Right behind the right ear   • Nose Bleed   • Cough   • Nasal Congestion     Daily use of OTC nasal sprays   Patient presents today for his annual wellness exam and to establish care.  He is also here to check on multiple chronic health conditions as well.  He has been advised to wear sunscreen and a seatbelt.  He does have an enlarging mass right below his right ear that his significant other has been observing for well over a year and it does seem to be enlarging.  Is not tender CT and ENT eval have been ordered.  It is 2 cm and there is a family history of leukemia or lymphoma.  Patient also snores and he will think about whether or not he wishes to have a sleep study done blood pressure was elevated today's having only minimal headache that he thinks may be due to ingesting alcohol last night no chest pain or change in his vision.  They will home monitor and send 10 blood pressures over the next 2 weeks we may need to start him on some blood pressure control medicine.  He also is complaining of early ejaculation and does have spermatocele so we will refer him to urology as well.  Patient is concerned because he does have a new sinus infection that is now somewhat bloody and he has history of this in the past so we will institute some Flonase do point-of-care flu and COVID testing and refer him also to ENT for this.    Health Maintenance Due   Topic Date Due   • TDAP/TD VACCINES (2 - Tdap) 07/20/2008   • HEPATITIS C SCREENING  Never done   • ANNUAL PHYSICAL  Never done   • COVID-19 Vaccine (3 - Booster for Pfizer series) 07/02/2021   • INFLUENZA VACCINE  08/01/2022       History of Present Illness     Vitals:    01/20/23 1101 01/20/23 1109   BP: (!) 152/111 (!) 166/120   BP Location: Right arm Left arm   Patient Position: Sitting Sitting   Cuff Size: Large Adult Large Adult  "  Pulse: 78    Temp: 97.3 °F (36.3 °C)    TempSrc: Tympanic    SpO2: 94%    Weight: 116 kg (254 lb 12.8 oz)    Height: 175.3 cm (69.02\")      Body mass index is 37.61 kg/m².    Current Outpatient Medications on File Prior to Visit   Medication Sig Dispense Refill   • Chlorcyclizine-Pseudoephed 25-60 MG tablet 1 tablet p.o. every 8 hours as needed nasal congestion and drainage 15 tablet 0   • [DISCONTINUED] benzonatate (TESSALON) 200 MG capsule 1 capsule p.o. every 8 hours as needed cough 21 capsule 0   • [DISCONTINUED] cyclobenzaprine (FLEXERIL) 10 MG tablet Take 10 mg by mouth 3 (Three) Times a Day As Needed for Muscle Spasms.       No current facility-administered medications on file prior to visit.       The following portions of the patient's history were reviewed and updated as appropriate: allergies, current medications, past family history, past medical history, past social history, past surgical history and problem list.    Review of Systems   HENT: Positive for nosebleeds and sinus pressure.    Genitourinary:        Early ejaculation   Allergic/Immunologic: Positive for environmental allergies.   Hematological: Positive for adenopathy.   Psychiatric/Behavioral: Positive for sleep disturbance.       Objective   Physical Exam  Vitals reviewed.   Constitutional:       General: He is not in acute distress.     Appearance: He is well-developed. He is obese. He is not ill-appearing or toxic-appearing.   HENT:      Head: Normocephalic and atraumatic.      Right Ear: Tympanic membrane, ear canal and external ear normal.      Left Ear: Tympanic membrane, ear canal and external ear normal.      Nose: Congestion and rhinorrhea present.      Mouth/Throat:      Mouth: Mucous membranes are moist.      Pharynx: No posterior oropharyngeal erythema.   Eyes:      Extraocular Movements: Extraocular movements intact.      Conjunctiva/sclera: Conjunctivae normal.      Pupils: Pupils are equal, round, and reactive to light. "   Cardiovascular:      Rate and Rhythm: Normal rate and regular rhythm.      Heart sounds: Normal heart sounds.   Pulmonary:      Effort: Pulmonary effort is normal.      Breath sounds: Normal breath sounds.   Abdominal:      General: Bowel sounds are normal. There is no distension.      Palpations: Abdomen is soft. There is no mass.      Tenderness: There is no abdominal tenderness.   Musculoskeletal:         General: Normal range of motion.      Cervical back: Neck supple.   Skin:     General: Skin is warm.   Neurological:      General: No focal deficit present.      Mental Status: He is alert and oriented to person, place, and time.   Psychiatric:         Mood and Affect: Mood normal.         Behavior: Behavior normal.       PHQ-9 Total Score: 0    Assessment & Plan   Diagnoses and all orders for this visit:    1. Allergy, initial encounter (Primary)  -     Ambulatory Referral to ENT (Otolaryngology)    2. Neck mass  -     Ambulatory Referral to ENT (Otolaryngology)  -     CT Soft Tissue Neck Without Contrast; Future    3. Acute recurrent sinusitis, unspecified location  -     POCT SARS-CoV-2 Antigen LELO + Flu  -     Ambulatory Referral to ENT (Otolaryngology)    4. Abnormal ejaculation  -     Ambulatory Referral to Urology    5. Primary hypertension    6. Class 2 obesity due to excess calories without serious comorbidity with body mass index (BMI) of 37.0 to 37.9 in adult    7. Tobacco use disorder    8. Snoring    9. Other acute recurrent sinusitis    10. Encounter for annual general medical examination with abnormal findings in adult    11. Screening for HIV (human immunodeficiency virus)    12. Encounter for hepatitis C virus screening test for high risk patient    13. Screening cholesterol level    14. Screening for diabetes mellitus    15. Vitamin D deficiency    Other orders  -     fluticasone (FLONASE) 50 MCG/ACT nasal spray; 2 sprays into the nostril(s) as directed by provider Daily.  Dispense: 1 mL;  Refill: 3        Patient Instructions     Health Maintenance Due   Topic Date Due   • TDAP/TD VACCINES (2 - Tdap) 07/20/2008   • HEPATITIS C SCREENING  Never done   • ANNUAL PHYSICAL  Never done   • COVID-19 Vaccine (3 - Booster for Pfizer series) 07/02/2021   • INFLUENZA VACCINE  08/01/2022    Bring BP cuff when comes for labsCheck blood pressure cuff for accuracy and send 10 blood pressures over 2 weeks.  Watch sodium, alcohol and weight     12 hour fast for labs    Move chew around and get dental and ey appoinment    Think about sleep apnea for next visit.  Remember to get Colonoscopy at age 41

## 2023-01-20 NOTE — PATIENT INSTRUCTIONS
Health Maintenance Due   Topic Date Due    TDAP/TD VACCINES (2 - Tdap) 07/20/2008    HEPATITIS C SCREENING  Never done    ANNUAL PHYSICAL  Never done    COVID-19 Vaccine (3 - Booster for Pfizer series) 07/02/2021    INFLUENZA VACCINE  08/01/2022    Bring BP cuff when comes for labsCheck blood pressure cuff for accuracy and send 10 blood pressures over 2 weeks.  Watch sodium, alcohol and weight     12 hour fast for labs    Move chew around and get dental and ey appoinment    Think about sleep apnea for next visit.  Remember to get Colonoscopy at age 41

## 2023-01-27 ENCOUNTER — CLINICAL SUPPORT (OUTPATIENT)
Dept: FAMILY MEDICINE CLINIC | Facility: CLINIC | Age: 38
End: 2023-01-27
Payer: COMMERCIAL

## 2023-01-27 DIAGNOSIS — I10 PRIMARY HYPERTENSION: ICD-10-CM

## 2023-01-27 DIAGNOSIS — Z91.89 ENCOUNTER FOR HEPATITIS C VIRUS SCREENING TEST FOR HIGH RISK PATIENT: ICD-10-CM

## 2023-01-27 DIAGNOSIS — Z11.59 ENCOUNTER FOR HEPATITIS C VIRUS SCREENING TEST FOR HIGH RISK PATIENT: ICD-10-CM

## 2023-01-27 DIAGNOSIS — Z13.220 SCREENING CHOLESTEROL LEVEL: ICD-10-CM

## 2023-01-27 DIAGNOSIS — Z11.4 SCREENING FOR HIV (HUMAN IMMUNODEFICIENCY VIRUS): ICD-10-CM

## 2023-01-27 DIAGNOSIS — E55.9 VITAMIN D DEFICIENCY: ICD-10-CM

## 2023-01-27 DIAGNOSIS — Z13.1 SCREENING FOR DIABETES MELLITUS: ICD-10-CM

## 2023-01-27 LAB
25(OH)D3 SERPL-MCNC: 23.5 NG/ML (ref 30–100)
ALBUMIN SERPL-MCNC: 4.6 G/DL (ref 3.5–5.2)
ALBUMIN/GLOB SERPL: 1.8 G/DL
ALP SERPL-CCNC: 67 U/L (ref 39–117)
ALT SERPL W P-5'-P-CCNC: 88 U/L (ref 1–41)
ANION GAP SERPL CALCULATED.3IONS-SCNC: 6.5 MMOL/L (ref 5–15)
AST SERPL-CCNC: 40 U/L (ref 1–40)
BASOPHILS # BLD AUTO: 0.04 10*3/MM3 (ref 0–0.2)
BASOPHILS NFR BLD AUTO: 0.6 % (ref 0–1.5)
BILIRUB SERPL-MCNC: 0.5 MG/DL (ref 0–1.2)
BUN SERPL-MCNC: 8 MG/DL (ref 6–20)
BUN/CREAT SERPL: 8.6 (ref 7–25)
CALCIUM SPEC-SCNC: 9.5 MG/DL (ref 8.6–10.5)
CHLORIDE SERPL-SCNC: 101 MMOL/L (ref 98–107)
CHOLEST SERPL-MCNC: 211 MG/DL (ref 0–200)
CO2 SERPL-SCNC: 31.5 MMOL/L (ref 22–29)
CREAT SERPL-MCNC: 0.93 MG/DL (ref 0.76–1.27)
DEPRECATED RDW RBC AUTO: 42.3 FL (ref 37–54)
EGFRCR SERPLBLD CKD-EPI 2021: 109.1 ML/MIN/1.73
EOSINOPHIL # BLD AUTO: 0.09 10*3/MM3 (ref 0–0.4)
EOSINOPHIL NFR BLD AUTO: 1.3 % (ref 0.3–6.2)
ERYTHROCYTE [DISTWIDTH] IN BLOOD BY AUTOMATED COUNT: 13.1 % (ref 12.3–15.4)
GLOBULIN UR ELPH-MCNC: 2.6 GM/DL
GLUCOSE SERPL-MCNC: 103 MG/DL (ref 65–99)
HCT VFR BLD AUTO: 48.8 % (ref 37.5–51)
HCV AB SER DONR QL: NORMAL
HDLC SERPL-MCNC: 41 MG/DL (ref 40–60)
HGB BLD-MCNC: 16.5 G/DL (ref 13–17.7)
HIV1+2 AB SER QL: NORMAL
IMM GRANULOCYTES # BLD AUTO: 0.02 10*3/MM3 (ref 0–0.05)
IMM GRANULOCYTES NFR BLD AUTO: 0.3 % (ref 0–0.5)
LDLC SERPL CALC-MCNC: 140 MG/DL (ref 0–100)
LDLC/HDLC SERPL: 3.34 {RATIO}
LYMPHOCYTES # BLD AUTO: 2.32 10*3/MM3 (ref 0.7–3.1)
LYMPHOCYTES NFR BLD AUTO: 34.2 % (ref 19.6–45.3)
MCH RBC QN AUTO: 30.3 PG (ref 26.6–33)
MCHC RBC AUTO-ENTMCNC: 33.8 G/DL (ref 31.5–35.7)
MCV RBC AUTO: 89.5 FL (ref 79–97)
MONOCYTES # BLD AUTO: 0.41 10*3/MM3 (ref 0.1–0.9)
MONOCYTES NFR BLD AUTO: 6 % (ref 5–12)
NEUTROPHILS NFR BLD AUTO: 3.91 10*3/MM3 (ref 1.7–7)
NEUTROPHILS NFR BLD AUTO: 57.6 % (ref 42.7–76)
NRBC BLD AUTO-RTO: 0 /100 WBC (ref 0–0.2)
PLATELET # BLD AUTO: 230 10*3/MM3 (ref 140–450)
PMV BLD AUTO: 11.4 FL (ref 6–12)
POTASSIUM SERPL-SCNC: 3.7 MMOL/L (ref 3.5–5.2)
PROT SERPL-MCNC: 7.2 G/DL (ref 6–8.5)
RBC # BLD AUTO: 5.45 10*6/MM3 (ref 4.14–5.8)
SODIUM SERPL-SCNC: 139 MMOL/L (ref 136–145)
TRIGL SERPL-MCNC: 165 MG/DL (ref 0–150)
VLDLC SERPL-MCNC: 30 MG/DL (ref 5–40)
WBC NRBC COR # BLD: 6.79 10*3/MM3 (ref 3.4–10.8)

## 2023-01-27 PROCEDURE — 36415 COLL VENOUS BLD VENIPUNCTURE: CPT | Performed by: PREVENTIVE MEDICINE

## 2023-01-27 PROCEDURE — 85025 COMPLETE CBC W/AUTO DIFF WBC: CPT | Performed by: PREVENTIVE MEDICINE

## 2023-01-27 PROCEDURE — 86803 HEPATITIS C AB TEST: CPT | Performed by: PREVENTIVE MEDICINE

## 2023-01-27 PROCEDURE — 80061 LIPID PANEL: CPT | Performed by: PREVENTIVE MEDICINE

## 2023-01-27 PROCEDURE — 82306 VITAMIN D 25 HYDROXY: CPT | Performed by: PREVENTIVE MEDICINE

## 2023-01-27 PROCEDURE — G0432 EIA HIV-1/HIV-2 SCREEN: HCPCS | Performed by: PREVENTIVE MEDICINE

## 2023-01-27 PROCEDURE — 80053 COMPREHEN METABOLIC PANEL: CPT | Performed by: PREVENTIVE MEDICINE

## 2023-01-27 NOTE — PROGRESS NOTES
Venipuncture performed on Left Arm by Alaina Reddy MA  with good hemostasis. Patient tolerated well. 01/27/23 Susanne Carey MD

## 2023-01-28 NOTE — PROGRESS NOTES
Vitamin D is low so increase to 1000 units daily over-the-counter.  Glucose is also elevated to 103 so watch carbs and increase walking.  Total cholesterol was 211 goal is below 200 bad cholesterol was 140 and goal is below 100.  One of the liver enzymes is elevated so I hate to start a statin drug to help with cholesterol so do try to cut way back on saturated fats and increase your walking call if any other questions or concerns.

## 2023-01-30 ENCOUNTER — TELEPHONE (OUTPATIENT)
Dept: FAMILY MEDICINE CLINIC | Facility: CLINIC | Age: 38
End: 2023-01-30
Payer: COMMERCIAL

## 2023-01-30 NOTE — TELEPHONE ENCOUNTER
HUB TO READ:  ----- Message from Susanne Carey MD sent at 1/28/2023  8:06 AM EST -----  Vitamin D is low so increase to 1000 units daily over-the-counter.  Glucose is also elevated to 103 so watch carbs and increase walking.  Total cholesterol was 211 goal is below 200 bad cholesterol was 140 and goal is below 100.  One of the liver enzymes is elevated so I hate to start a statin drug to help with cholesterol so do try to cut way back on saturated fats and increase your walking call if any other questions or concerns.

## 2023-02-06 RX ORDER — LISINOPRIL 10 MG/1
10 TABLET ORAL DAILY
Qty: 90 TABLET | Refills: 3 | Status: SHIPPED | OUTPATIENT
Start: 2023-02-06 | End: 2023-03-01 | Stop reason: DRUGHIGH

## 2023-02-11 ENCOUNTER — HOSPITAL ENCOUNTER (OUTPATIENT)
Dept: CT IMAGING | Facility: HOSPITAL | Age: 38
Discharge: HOME | End: 2023-02-11
Admitting: PREVENTIVE MEDICINE
Payer: COMMERCIAL

## 2023-02-11 DIAGNOSIS — R22.1 NECK MASS: ICD-10-CM

## 2023-02-11 PROCEDURE — 70490 CT SOFT TISSUE NECK W/O DYE: CPT

## 2023-02-12 NOTE — PROGRESS NOTES
Neck mass peers to be a lipoma.  This is just a fatty collection of tissue.  He can get a second opinion from an ear nose and throat surgeon if he desires but radiology feels fairly confident that is what it is.  There was some thickening in one of the sinuses that is not related to the neck mass.  This is a common finding and is not of a concern.Call if any other questions or concerns.

## 2023-02-13 ENCOUNTER — TELEPHONE (OUTPATIENT)
Dept: FAMILY MEDICINE CLINIC | Facility: CLINIC | Age: 38
End: 2023-02-13
Payer: COMMERCIAL

## 2023-02-13 NOTE — TELEPHONE ENCOUNTER
HUB TO READ:  ----- Message from Susanne Carey MD sent at 2/12/2023 11:37 AM EST -----  Neck mass peers to be a lipoma.  This is just a fatty collection of tissue.  He can get a second opinion from an ear nose and throat surgeon if he desires but radiology feels fairly confident that is what it is.  There was some thickening in one of the sinuses that is not related to the neck mass.  This is a common finding and is not of a concern.Call if any other questions or concerns.

## 2023-03-01 ENCOUNTER — TELEPHONE (OUTPATIENT)
Dept: FAMILY MEDICINE CLINIC | Facility: CLINIC | Age: 38
End: 2023-03-01
Payer: COMMERCIAL

## 2023-03-01 RX ORDER — LISINOPRIL 20 MG/1
20 TABLET ORAL DAILY
Qty: 90 TABLET | Refills: 3 | Status: SHIPPED | OUTPATIENT
Start: 2023-03-01

## 2023-03-01 NOTE — TELEPHONE ENCOUNTER
----- Message from Skylar Shrestha sent at 3/1/2023 10:27 AM EST -----  Regarding: FW: Blood Pressure   Contact: [phone]  They were averaged?  ----- Message -----  From: Susanne Carey MD  Sent: 3/1/2023  10:26 AM EST  To: [facility]  Subject: FW: Blood Pressure                               Please average and send back to me-BP's  ----- Message -----  From: Skylar Shrestha  Sent: 2/28/2023   5:50 PM EST  To: Susanne Carey MD  Subject: FW: Blood Pressure                               8 bp averaging 145/95 P74  ----- Message -----  From: Bo Kay  Sent: 2/28/2023   4:33 PM EST  To: [facility]  Subject: Blood Pressure                                   B/P after Meds

## 2023-03-01 NOTE — TELEPHONE ENCOUNTER
Please call patient and advise he increases Lisinopril to 20 mg and after one month-call 10 more blood pressures and pulses

## 2023-04-20 PROBLEM — E78.49 OTHER HYPERLIPIDEMIA: Status: ACTIVE | Noted: 2023-04-20

## 2023-04-20 PROBLEM — E55.9 VITAMIN D DEFICIENCY: Status: ACTIVE | Noted: 2023-04-20

## 2023-04-20 NOTE — PATIENT INSTRUCTIONS
Health Maintenance Due   Topic Date Due    TDAP/TD VACCINES (2 - Tdap) 07/20/2008    COVID-19 Vaccine (3 - Booster for Pfizer series) 07/02/2021

## 2023-04-21 ENCOUNTER — OFFICE VISIT (OUTPATIENT)
Dept: FAMILY MEDICINE CLINIC | Facility: CLINIC | Age: 38
End: 2023-04-21
Payer: COMMERCIAL

## 2023-04-21 VITALS
HEIGHT: 69 IN | DIASTOLIC BLOOD PRESSURE: 67 MMHG | OXYGEN SATURATION: 96 % | HEART RATE: 53 BPM | BODY MASS INDEX: 34.66 KG/M2 | TEMPERATURE: 97.3 F | WEIGHT: 234 LBS | SYSTOLIC BLOOD PRESSURE: 114 MMHG

## 2023-04-21 DIAGNOSIS — E55.9 VITAMIN D DEFICIENCY: ICD-10-CM

## 2023-04-21 DIAGNOSIS — R22.1 NECK MASS: ICD-10-CM

## 2023-04-21 DIAGNOSIS — F17.200 TOBACCO USE DISORDER: ICD-10-CM

## 2023-04-21 DIAGNOSIS — E66.09 CLASS 1 OBESITY DUE TO EXCESS CALORIES WITH SERIOUS COMORBIDITY AND BODY MASS INDEX (BMI) OF 34.0 TO 34.9 IN ADULT: ICD-10-CM

## 2023-04-21 DIAGNOSIS — E78.49 OTHER HYPERLIPIDEMIA: ICD-10-CM

## 2023-04-21 DIAGNOSIS — I10 PRIMARY HYPERTENSION: ICD-10-CM

## 2023-04-21 DIAGNOSIS — R06.83 SNORING: Primary | ICD-10-CM

## 2023-04-21 PROBLEM — E66.811 CLASS 1 OBESITY DUE TO EXCESS CALORIES WITH SERIOUS COMORBIDITY AND BODY MASS INDEX (BMI) OF 34.0 TO 34.9 IN ADULT: Status: ACTIVE | Noted: 2023-01-20

## 2023-04-21 RX ORDER — TRIAMCINOLONE ACETONIDE 55 UG/1
SPRAY, METERED NASAL
COMMUNITY
Start: 2023-03-03

## 2023-04-21 NOTE — PROGRESS NOTES
"Subjective   Bo Kay is a 37 y.o. male presents for   Chief Complaint   Patient presents with   • Allergies     3 month follow up   • Hypertension       Health Maintenance Due   Topic Date Due   • TDAP/TD VACCINES (2 - Tdap) 07/20/2008   • COVID-19 Vaccine (3 - Booster for Pfizer series) 07/02/2021       DAXHPI    The patient presents today for follow-up up on snoring, tobacco use disorder, hypertension, vitamin D deficiency, hyperlipidemia, and class I obesity with serious comorbidities.    Patient states he has been having issues with allergies since the weather change. He has been using Nasacort and Flonase.     Patients blood pressure is well controlled. His blood pressure was 114/67 mmHg when checked in the clinic today.     The patient notes he has been dieting. He has been monitoring his portion size. He has lost 20 pounds since his last visit in 01/2023.     He states he is still snoring. He notes he has been following ENT and his sinus cavity opening is only at a 1 mm and they are going to work on expanding his sinus cavity.     Patient notes the mass in his neck appears to be a fatty spot. He did have a CT scan and biopsy done on the mass.     Patient declined help with smoking cessation at this time.     Patient is not currenlty take vitamin D.     He has been monitoring his saturated fat intake.     Patient is curently due for a tetanus vaccination.       Vitals:    04/21/23 1059   BP: 114/67   Pulse: 53   Temp: 97.3 °F (36.3 °C)   TempSrc: Infrared   SpO2: 96%   Weight: 106 kg (234 lb)   Height: 175.3 cm (69.02\")     Body mass index is 34.54 kg/m².    Current Outpatient Medications on File Prior to Visit   Medication Sig Dispense Refill   • fluticasone (FLONASE) 50 MCG/ACT nasal spray 2 sprays into the nostril(s) as directed by provider Daily. 1 mL 3   • lisinopril (PRINIVIL,ZESTRIL) 20 MG tablet Take 1 tablet by mouth Daily. 90 tablet 3   • Triamcinolone Acetonide (NASACORT) 55 MCG/ACT nasal " inhaler USE 2 SPRAYS IN EACH NOSTRIL IN THE MORNING AS NEEDED       No current facility-administered medications on file prior to visit.       The following portions of the patient's history were reviewed and updated as appropriate: allergies, current medications, past family history, past medical history, past social history, past surgical history and problem list.    Review of Systems   Constitutional: Negative for chills, diaphoresis, fatigue and fever.   HENT: Negative for hearing loss, tinnitus and trouble swallowing.    Eyes: Negative for blurred vision, double vision, photophobia and visual disturbance.   Respiratory: Negative for cough, chest tightness, shortness of breath and wheezing.    Cardiovascular: Negative for chest pain and palpitations.   Gastrointestinal: Negative for abdominal pain, blood in stool, constipation, diarrhea, nausea, vomiting and indigestion.   Genitourinary: Negative for dysuria, hematuria and erectile dysfunction.   Neurological: Negative for dizziness, seizures, syncope and headache.   Psychiatric/Behavioral: Negative for suicidal ideas.     DAXROS    Objective   Physical Exam  Constitutional:       Appearance: He is obese.   HENT:      Right Ear: Tympanic membrane normal.      Left Ear: Tympanic membrane normal.      Nose: Nose normal.      Mouth/Throat:      Mouth: Mucous membranes are moist.      Pharynx: Oropharynx is clear.   Eyes:      Pupils: Pupils are equal, round, and reactive to light.   Neck:      Vascular: No carotid bruit.      Comments:  ill-defined 2 cm soft mass on right side of his neck slightly at the level of the ear, decreasing in size, has been biosied and CAT scanned and not felt to be an issue  Cardiovascular:      Rate and Rhythm: Normal rate and regular rhythm.      Pulses: Normal pulses.      Heart sounds: Normal heart sounds. No murmur heard.    No gallop.   Pulmonary:      Breath sounds: No wheezing, rhonchi or rales.      Comments: Breath sounds  decreased but equal in all six lung fields  Abdominal:      General: Bowel sounds are normal.      Palpations: There is no mass.      Tenderness: There is no abdominal tenderness.   Musculoskeletal:      Right lower leg: No edema.      Left lower leg: Edema present.   Lymphadenopathy:      Cervical: No cervical adenopathy.   Psychiatric:         Mood and Affect: Mood normal.       DAXEXAM  PHQ-9 Total Score:      Assessment & Plan   Diagnoses and all orders for this visit:    1. Snoring (Primary)  Assessment & Plan:  - well controlled  - patient will continue to follow up with ENT      2. Tobacco use disorder  Assessment & Plan:  - patient declined help with tobacco cessation at this time      3. Neck mass  -     TSH; Future    4. Primary hypertension  Assessment & Plan:  - well controlled  - fasting labs will be obtained today    Orders:  -     CBC Auto Differential; Future  -     Comprehensive Metabolic Panel; Future    5. Vitamin D deficiency  Assessment & Plan:  - fasting labs will be obtained today    Orders:  -     Vitamin D,25-Hydroxy; Future    6. Other hyperlipidemia  Assessment & Plan:  - fasting labs will be obtained today  - advised patient to continue to monitor saturate fat intake and increase daily exercise    Orders:  -     Lipid Panel; Future    7. Class 1 obesity due to excess calories with serious comorbidity and body mass index (BMI) of 34.0 to 34.9 in adult  Assessment & Plan:  - advised patient to continue to monitor portion size and increase daily exercise        DAXPLAN  DAXRESULTS    Patient Instructions     Health Maintenance Due   Topic Date Due   • TDAP/TD VACCINES (2 - Tdap) 07/20/2008   • COVID-19 Vaccine (3 - Booster for Pfizer series) 07/02/2021        Patient will follow up in 6 months for recheck.     Transcribed from ambient dictation for Susanne Carey MD by Fernanda Solis.  04/21/23   13:13 EDT    Patient or patient representative verbalized consent to the visit  recording.  I have personally performed the services described in this document as transcribed by the above individual, and it is both accurate and complete.

## 2023-04-21 NOTE — ASSESSMENT & PLAN NOTE
- fasting labs will be obtained today  - advised patient to continue to monitor saturate fat intake and increase daily exercise

## 2023-04-28 ENCOUNTER — CLINICAL SUPPORT (OUTPATIENT)
Dept: FAMILY MEDICINE CLINIC | Facility: CLINIC | Age: 38
End: 2023-04-28
Payer: COMMERCIAL

## 2023-04-28 DIAGNOSIS — R22.1 NECK MASS: ICD-10-CM

## 2023-04-28 DIAGNOSIS — E55.9 VITAMIN D DEFICIENCY: ICD-10-CM

## 2023-04-28 DIAGNOSIS — E78.49 OTHER HYPERLIPIDEMIA: ICD-10-CM

## 2023-04-28 DIAGNOSIS — I10 PRIMARY HYPERTENSION: ICD-10-CM

## 2023-04-28 LAB
25(OH)D3 SERPL-MCNC: 26 NG/ML (ref 30–100)
ALBUMIN SERPL-MCNC: 4.5 G/DL (ref 3.5–5.2)
ALBUMIN/GLOB SERPL: 2 G/DL
ALP SERPL-CCNC: 54 U/L (ref 39–117)
ALT SERPL W P-5'-P-CCNC: 53 U/L (ref 1–41)
ANION GAP SERPL CALCULATED.3IONS-SCNC: 11.5 MMOL/L (ref 5–15)
AST SERPL-CCNC: 31 U/L (ref 1–40)
BASOPHILS # BLD AUTO: 0.02 10*3/MM3 (ref 0–0.2)
BASOPHILS NFR BLD AUTO: 0.4 % (ref 0–1.5)
BILIRUB SERPL-MCNC: 0.5 MG/DL (ref 0–1.2)
BUN SERPL-MCNC: 12 MG/DL (ref 6–20)
BUN/CREAT SERPL: 12.8 (ref 7–25)
CALCIUM SPEC-SCNC: 9.7 MG/DL (ref 8.6–10.5)
CHLORIDE SERPL-SCNC: 104 MMOL/L (ref 98–107)
CHOLEST SERPL-MCNC: 175 MG/DL (ref 0–200)
CO2 SERPL-SCNC: 24.5 MMOL/L (ref 22–29)
CREAT SERPL-MCNC: 0.94 MG/DL (ref 0.76–1.27)
DEPRECATED RDW RBC AUTO: 42.5 FL (ref 37–54)
EGFRCR SERPLBLD CKD-EPI 2021: 107.7 ML/MIN/1.73
EOSINOPHIL # BLD AUTO: 0.07 10*3/MM3 (ref 0–0.4)
EOSINOPHIL NFR BLD AUTO: 1.4 % (ref 0.3–6.2)
ERYTHROCYTE [DISTWIDTH] IN BLOOD BY AUTOMATED COUNT: 13 % (ref 12.3–15.4)
GLOBULIN UR ELPH-MCNC: 2.3 GM/DL
GLUCOSE SERPL-MCNC: 88 MG/DL (ref 65–99)
HCT VFR BLD AUTO: 44.2 % (ref 37.5–51)
HDLC SERPL-MCNC: 36 MG/DL (ref 40–60)
HGB BLD-MCNC: 14.8 G/DL (ref 13–17.7)
IMM GRANULOCYTES # BLD AUTO: 0.01 10*3/MM3 (ref 0–0.05)
IMM GRANULOCYTES NFR BLD AUTO: 0.2 % (ref 0–0.5)
LDLC SERPL CALC-MCNC: 121 MG/DL (ref 0–100)
LDLC/HDLC SERPL: 3.32 {RATIO}
LYMPHOCYTES # BLD AUTO: 1.59 10*3/MM3 (ref 0.7–3.1)
LYMPHOCYTES NFR BLD AUTO: 31.1 % (ref 19.6–45.3)
MCH RBC QN AUTO: 30 PG (ref 26.6–33)
MCHC RBC AUTO-ENTMCNC: 33.5 G/DL (ref 31.5–35.7)
MCV RBC AUTO: 89.5 FL (ref 79–97)
MONOCYTES # BLD AUTO: 0.32 10*3/MM3 (ref 0.1–0.9)
MONOCYTES NFR BLD AUTO: 6.3 % (ref 5–12)
NEUTROPHILS NFR BLD AUTO: 3.1 10*3/MM3 (ref 1.7–7)
NEUTROPHILS NFR BLD AUTO: 60.6 % (ref 42.7–76)
NRBC BLD AUTO-RTO: 0 /100 WBC (ref 0–0.2)
PLATELET # BLD AUTO: 209 10*3/MM3 (ref 140–450)
PMV BLD AUTO: 12 FL (ref 6–12)
POTASSIUM SERPL-SCNC: 4.3 MMOL/L (ref 3.5–5.2)
PROT SERPL-MCNC: 6.8 G/DL (ref 6–8.5)
RBC # BLD AUTO: 4.94 10*6/MM3 (ref 4.14–5.8)
SODIUM SERPL-SCNC: 140 MMOL/L (ref 136–145)
TRIGL SERPL-MCNC: 97 MG/DL (ref 0–150)
TSH SERPL DL<=0.05 MIU/L-ACNC: 0.64 UIU/ML (ref 0.27–4.2)
VLDLC SERPL-MCNC: 18 MG/DL (ref 5–40)
WBC NRBC COR # BLD: 5.11 10*3/MM3 (ref 3.4–10.8)

## 2023-04-28 PROCEDURE — 80053 COMPREHEN METABOLIC PANEL: CPT | Performed by: PREVENTIVE MEDICINE

## 2023-04-28 PROCEDURE — 84443 ASSAY THYROID STIM HORMONE: CPT | Performed by: PREVENTIVE MEDICINE

## 2023-04-28 PROCEDURE — 80061 LIPID PANEL: CPT | Performed by: PREVENTIVE MEDICINE

## 2023-04-28 PROCEDURE — 82306 VITAMIN D 25 HYDROXY: CPT | Performed by: PREVENTIVE MEDICINE

## 2023-04-28 PROCEDURE — 85025 COMPLETE CBC W/AUTO DIFF WBC: CPT | Performed by: PREVENTIVE MEDICINE

## 2023-04-28 NOTE — PROGRESS NOTES
Venipuncture performed on Left Arm by Alaina Reddy MA  with good hemostasis. Patient tolerated well. 04/28/23   Susanne Carey MD

## 2023-04-30 ENCOUNTER — TELEPHONE (OUTPATIENT)
Dept: FAMILY MEDICINE CLINIC | Facility: CLINIC | Age: 38
End: 2023-04-30
Payer: COMMERCIAL

## 2023-04-30 NOTE — TELEPHONE ENCOUNTER
HUB TO READ  ----- Message from Susanne Carey MD sent at 4/30/2023  7:09 AM EDT -----  Bad cholesterol was 121 and goal is below 100.  If he is done all he can with diet and exercise we should start him on a statin please let us know as soon as possible.  Vitamin D is also low so increase of 1000 units daily over-the-counter and we will continue to monitor call if any other questions or concerns

## 2023-04-30 NOTE — PROGRESS NOTES
Bad cholesterol was 121 and goal is below 100.  If he is done all he can with diet and exercise we should start him on a statin please let us know as soon as possible.  Vitamin D is also low so increase of 1000 units daily over-the-counter and we will continue to monitor call if any other questions or concerns

## 2023-05-22 ENCOUNTER — OFFICE VISIT (OUTPATIENT)
Dept: FAMILY MEDICINE CLINIC | Facility: CLINIC | Age: 38
End: 2023-05-22
Payer: COMMERCIAL

## 2023-05-22 VITALS
WEIGHT: 232.8 LBS | OXYGEN SATURATION: 97 % | HEART RATE: 58 BPM | BODY MASS INDEX: 34.48 KG/M2 | HEIGHT: 69 IN | SYSTOLIC BLOOD PRESSURE: 136 MMHG | TEMPERATURE: 97.3 F | DIASTOLIC BLOOD PRESSURE: 89 MMHG

## 2023-05-22 DIAGNOSIS — M54.50 ACUTE MIDLINE LOW BACK PAIN WITHOUT SCIATICA: Primary | ICD-10-CM

## 2023-05-22 PROCEDURE — 99213 OFFICE O/P EST LOW 20 MIN: CPT | Performed by: NURSE PRACTITIONER

## 2023-05-22 RX ORDER — CYCLOBENZAPRINE HCL 10 MG
10 TABLET ORAL 3 TIMES DAILY PRN
Qty: 15 TABLET | Refills: 0 | Status: SHIPPED | OUTPATIENT
Start: 2023-05-22

## 2023-05-22 RX ORDER — PREDNISONE 10 MG/1
TABLET ORAL
Qty: 1 EACH | Refills: 0 | Status: SHIPPED | OUTPATIENT
Start: 2023-05-22

## 2023-05-22 NOTE — PROGRESS NOTES
"Subjective   Bo Kay is a 37 y.o. male seen today for Back Pain (Began off and on had back surgery in 2019.  ).     History of Present Illness     The patient,  1985 presents for back pain.     The patient had back surgery in 2019 for a herniated disk. He was going to the bathroom at 1:30 this morning and had excruciating back pain. He took Tylenol and it helped somewhat. His pain is primarily in his back and not radiating down his legs. His pain is aggravated by standing or bending over. The patient has taken steroids before.     The following portions of the patient's history were reviewed and updated as appropriate: allergies, current medications, past social history and problem list.    Review of Systems   Constitutional: Negative for chills, fatigue and fever.   HENT: Negative for congestion, ear pain, rhinorrhea and sore throat.    Respiratory: Negative for cough, shortness of breath and wheezing.    Cardiovascular: Negative for chest pain, palpitations and leg swelling.   Gastrointestinal: Negative for abdominal pain, blood in stool, constipation, diarrhea, nausea and vomiting.   Genitourinary: Negative for dysuria and hematuria.   Musculoskeletal: Positive for back pain.   Skin: Negative.    Neurological: Negative for dizziness, light-headedness and headaches.   Psychiatric/Behavioral: Negative for dysphoric mood and sleep disturbance. The patient is not nervous/anxious.        Objective   /89 (BP Location: Left arm, Patient Position: Sitting, Cuff Size: Adult)   Pulse 58   Temp 97.3 °F (36.3 °C) (Temporal)   Ht 175.3 cm (69.02\")   Wt 106 kg (232 lb 12.8 oz)   SpO2 97%   BMI 34.36 kg/m²   Physical Exam  Vitals and nursing note reviewed.   Constitutional:       Appearance: Normal appearance. He is well-developed. He is obese.   HENT:      Head: Normocephalic and atraumatic.      Right Ear: External ear normal.      Left Ear: External ear normal.      Nose: Nose normal.   Eyes:     "  Extraocular Movements: Extraocular movements intact.      Pupils: Pupils are equal, round, and reactive to light.   Cardiovascular:      Rate and Rhythm: Normal rate and regular rhythm.      Heart sounds: Normal heart sounds.   Pulmonary:      Effort: Pulmonary effort is normal.      Breath sounds: Normal breath sounds.   Abdominal:      General: Bowel sounds are normal.      Palpations: Abdomen is soft.   Musculoskeletal:      Cervical back: Normal range of motion and neck supple.      Lumbar back: No bony tenderness. Decreased range of motion (flexion limited, normal ROM in all other fields). Negative right straight leg raise test and negative left straight leg raise test.      Comments: Negative Efraín  BLE strength 5/5  BLE reflexes equal and wnl   Skin:     General: Skin is warm and dry.   Neurological:      General: No focal deficit present.      Mental Status: He is alert and oriented to person, place, and time.   Psychiatric:         Mood and Affect: Mood normal.         Behavior: Behavior normal.           Assessment & Plan   Problems Addressed this Visit    None  Visit Diagnoses     Acute midline low back pain without sciatica    -  Primary    Prescribed steroid dose pack. He will contact us if the pain does not resolve.   Prescribed Flexeril to take at bedtime.     Relevant Medications    predniSONE (DELTASONE) 10 MG (21) dose pack    cyclobenzaprine (FLEXERIL) 10 MG tablet      Diagnoses       Codes Comments    Acute midline low back pain without sciatica    -  Primary ICD-10-CM: M54.50  ICD-9-CM: 724.2 Prescribed steroid dose pack. He will contact us if the pain does not resolve.   Prescribed Flexeril to take at bedtime.         Transcribed from ambient dictation for ANGIE Patton by Shaye Aguiar.  05/22/23   14:32 EDT    Patient or patient representative verbalized consent to the visit recording.  I have personally performed the services described in this document as transcribed by the above  individual, and it is both accurate and complete.

## 2023-05-22 NOTE — LETTER
May 22, 2023     Patient: Bo Kay   YOB: 1985   Date of Visit: 5/22/2023       To Whom It May Concern:    It is my medical opinion that Bo Kay may return to work in one day.            Sincerely,        ANGIE Patton    CC: No Recipients

## 2023-05-22 NOTE — PATIENT INSTRUCTIONS
You are due for adacel Tdap vaccination. (provides protection against tetanus, diptheria and whooping cough) Please  get the immunization at your local pharmacy at your earliest convenience. This immunization will next be due in 10 years. Please click on the link for more information about this vaccine.    [facility] Tdap Vaccine Information

## 2023-10-19 NOTE — PATIENT INSTRUCTIONS
Health Maintenance Due   Topic Date Due    TDAP/TD VACCINES (2 - Tdap) 07/20/2008    INFLUENZA VACCINE  08/01/2023    COVID-19 Vaccine (3 - 2023-24 season) 09/01/2023    Patient to check blood pressure weekly and if over 140/90 without decongestant call before 1 year

## 2023-10-20 ENCOUNTER — OFFICE VISIT (OUTPATIENT)
Dept: FAMILY MEDICINE CLINIC | Facility: CLINIC | Age: 38
End: 2023-10-20
Payer: COMMERCIAL

## 2023-10-20 VITALS
BODY MASS INDEX: 34.69 KG/M2 | HEIGHT: 69 IN | HEART RATE: 56 BPM | OXYGEN SATURATION: 95 % | WEIGHT: 234.2 LBS | DIASTOLIC BLOOD PRESSURE: 84 MMHG | SYSTOLIC BLOOD PRESSURE: 127 MMHG

## 2023-10-20 DIAGNOSIS — E55.9 VITAMIN D DEFICIENCY: Primary | ICD-10-CM

## 2023-10-20 DIAGNOSIS — E78.49 OTHER HYPERLIPIDEMIA: ICD-10-CM

## 2023-10-20 DIAGNOSIS — R06.83 SNORING: ICD-10-CM

## 2023-10-20 DIAGNOSIS — R22.1 NECK MASS: ICD-10-CM

## 2023-10-20 DIAGNOSIS — F17.200 TOBACCO USE DISORDER: ICD-10-CM

## 2023-10-20 DIAGNOSIS — I10 PRIMARY HYPERTENSION: ICD-10-CM

## 2023-10-20 DIAGNOSIS — E66.09 CLASS 1 OBESITY DUE TO EXCESS CALORIES WITH SERIOUS COMORBIDITY AND BODY MASS INDEX (BMI) OF 34.0 TO 34.9 IN ADULT: ICD-10-CM

## 2023-10-20 PROBLEM — M54.41 ACUTE RIGHT-SIDED LOW BACK PAIN WITH RIGHT-SIDED SCIATICA: Status: ACTIVE | Noted: 2019-09-11

## 2023-10-20 PROBLEM — Z87.81 HISTORY OF FRACTURE OF LEFT ANKLE: Status: ACTIVE | Noted: 2023-10-20

## 2023-10-20 LAB
25(OH)D3 SERPL-MCNC: 28.5 NG/ML (ref 30–100)
ALBUMIN SERPL-MCNC: 4.6 G/DL (ref 3.5–5.2)
ALBUMIN/GLOB SERPL: 2 G/DL
ALP SERPL-CCNC: 43 U/L (ref 39–117)
ALT SERPL W P-5'-P-CCNC: 50 U/L (ref 1–41)
ANION GAP SERPL CALCULATED.3IONS-SCNC: 8 MMOL/L (ref 5–15)
AST SERPL-CCNC: 31 U/L (ref 1–40)
BASOPHILS # BLD AUTO: 0.03 10*3/MM3 (ref 0–0.2)
BASOPHILS NFR BLD AUTO: 0.5 % (ref 0–1.5)
BILIRUB SERPL-MCNC: 0.6 MG/DL (ref 0–1.2)
BUN SERPL-MCNC: 11 MG/DL (ref 6–20)
BUN/CREAT SERPL: 14.1 (ref 7–25)
CALCIUM SPEC-SCNC: 9.4 MG/DL (ref 8.6–10.5)
CHLORIDE SERPL-SCNC: 105 MMOL/L (ref 98–107)
CHOLEST SERPL-MCNC: 188 MG/DL (ref 0–200)
CO2 SERPL-SCNC: 28 MMOL/L (ref 22–29)
CREAT SERPL-MCNC: 0.78 MG/DL (ref 0.76–1.27)
DEPRECATED RDW RBC AUTO: 38.8 FL (ref 37–54)
EGFRCR SERPLBLD CKD-EPI 2021: 117.8 ML/MIN/1.73
EOSINOPHIL # BLD AUTO: 0.11 10*3/MM3 (ref 0–0.4)
EOSINOPHIL NFR BLD AUTO: 1.8 % (ref 0.3–6.2)
ERYTHROCYTE [DISTWIDTH] IN BLOOD BY AUTOMATED COUNT: 12.1 % (ref 12.3–15.4)
GLOBULIN UR ELPH-MCNC: 2.3 GM/DL
GLUCOSE SERPL-MCNC: 94 MG/DL (ref 65–99)
HCT VFR BLD AUTO: 43.6 % (ref 37.5–51)
HDLC SERPL-MCNC: 38 MG/DL (ref 40–60)
HGB BLD-MCNC: 15.3 G/DL (ref 13–17.7)
IMM GRANULOCYTES # BLD AUTO: 0.02 10*3/MM3 (ref 0–0.05)
IMM GRANULOCYTES NFR BLD AUTO: 0.3 % (ref 0–0.5)
LDLC SERPL CALC-MCNC: 127 MG/DL (ref 0–100)
LDLC/HDLC SERPL: 3.29 {RATIO}
LYMPHOCYTES # BLD AUTO: 1.86 10*3/MM3 (ref 0.7–3.1)
LYMPHOCYTES NFR BLD AUTO: 30.6 % (ref 19.6–45.3)
MCH RBC QN AUTO: 31.5 PG (ref 26.6–33)
MCHC RBC AUTO-ENTMCNC: 35.1 G/DL (ref 31.5–35.7)
MCV RBC AUTO: 89.7 FL (ref 79–97)
MONOCYTES # BLD AUTO: 0.39 10*3/MM3 (ref 0.1–0.9)
MONOCYTES NFR BLD AUTO: 6.4 % (ref 5–12)
NEUTROPHILS NFR BLD AUTO: 3.67 10*3/MM3 (ref 1.7–7)
NEUTROPHILS NFR BLD AUTO: 60.4 % (ref 42.7–76)
NRBC BLD AUTO-RTO: 0 /100 WBC (ref 0–0.2)
PLATELET # BLD AUTO: 205 10*3/MM3 (ref 140–450)
PMV BLD AUTO: 11.8 FL (ref 6–12)
POTASSIUM SERPL-SCNC: 4 MMOL/L (ref 3.5–5.2)
PROT SERPL-MCNC: 6.9 G/DL (ref 6–8.5)
RBC # BLD AUTO: 4.86 10*6/MM3 (ref 4.14–5.8)
SODIUM SERPL-SCNC: 141 MMOL/L (ref 136–145)
TRIGL SERPL-MCNC: 125 MG/DL (ref 0–150)
VLDLC SERPL-MCNC: 23 MG/DL (ref 5–40)
WBC NRBC COR # BLD: 6.08 10*3/MM3 (ref 3.4–10.8)

## 2023-10-20 PROCEDURE — 80061 LIPID PANEL: CPT | Performed by: PREVENTIVE MEDICINE

## 2023-10-20 PROCEDURE — 82306 VITAMIN D 25 HYDROXY: CPT | Performed by: PREVENTIVE MEDICINE

## 2023-10-20 PROCEDURE — 80053 COMPREHEN METABOLIC PANEL: CPT | Performed by: PREVENTIVE MEDICINE

## 2023-10-20 PROCEDURE — 85025 COMPLETE CBC W/AUTO DIFF WBC: CPT | Performed by: PREVENTIVE MEDICINE

## 2023-10-20 NOTE — PROGRESS NOTES
"Chief Complaint  Follow-up (3 month)    Subjective        Bo Kay presents to [facility]  History of Present Illness    Bo Kay presents today for  follow-up on vitamin D deficiency, tobacco use disorder, snoring, hypertension, hyperlipidemia, class I obesity with serious comorbidities and body mass index of 34.    The patient reports 2 months ago he discontinued taking lisinopril for his blood pressure because it was causing him to cough, and notes his cough ceased when he stopped taking it. He states his at home blood pressure reading have been good.    The patient reports he takes Flexeril as needed for his back.    The patient states he uses chewing tobacco, and has a appointment set up to see a dentist. He reports seeing ENT and was diagnosed with a fatty tumor. He states the ENT did a x-ray of his sinuses and told the patient he had small openings and recommended a balloon procedure for the sinuses, but the patient has declined. He notes they also did allergy testing and recommended allergy shots, but the patient declined and will continue using Zyrtec D. The patient reports he continues to snore, but would be unable to wear a mask at night.    The patient reports having back surgery for a herniated disc and right side sciatic nerve in 2019. He states having occasional pain and inflammation requiring Flexeril and steroids. He notes the nonsteroidal anti-inflammatories did not work, including Aleve and ibuprofen.      Objective   Vital Signs:  /84 (BP Location: Left arm, Patient Position: Sitting, Cuff Size: Large Adult)   Pulse 56   Ht 175.3 cm (69.02\")   Wt 106 kg (234 lb 3.2 oz)   SpO2 95%   BMI 34.57 kg/m²   Estimated body mass index is 34.57 kg/m² as calculated from the following:    Height as of this encounter: 175.3 cm (69.02\").    Weight as of this encounter: 106 kg (234 lb 3.2 oz).     ROS:    Negative: hearing loss, cough, wheezing, chest pain, " palpitations, dysuria, hematuria, ED, fever, chills, sweating, blood in stool, headaches, syncope, seizures, erection or ejaculation issues.      Physical Exam  Constitutional:       Appearance: Normal appearance.   HENT:      Right Ear: Tympanic membrane normal.      Left Ear: Tympanic membrane normal.      Mouth/Throat:      Mouth: Mucous membranes are moist.      Pharynx: Oropharynx is clear.   Eyes:      Pupils: Pupils are equal, round, and reactive to light.   Neck:      Vascular: No carotid bruit.      Comments: No thyromegaly, thyroid masses, cervical or suboccipital adenopathy  Cardiovascular:      Rate and Rhythm: Normal rate and regular rhythm.      Pulses: Normal pulses.      Heart sounds: Normal heart sounds. No murmur heard.  Pulmonary:      Effort: Pulmonary effort is normal.      Breath sounds: Normal breath sounds. No wheezing, rhonchi or rales.   Abdominal:      General: Bowel sounds are normal. There is no distension.      Palpations: There is no mass.      Tenderness: There is no abdominal tenderness.   Musculoskeletal:      Right lower leg: No edema.      Left lower leg: No edema.   Neurological:      Mental Status: He is alert and oriented to person, place, and time.   Psychiatric:         Mood and Affect: Mood normal.         Behavior: Behavior normal.        Result Review :                   Assessment and Plan   Diagnoses and all orders for this visit:    1. Vitamin D deficiency (Primary)  -     Vitamin D,25-Hydroxy; Future  -     Vitamin D,25-Hydroxy    2. Tobacco use disorder    3. Snoring    4. Primary hypertension  -     CBC Auto Differential; Future  -     Comprehensive Metabolic Panel; Future  -     CBC Auto Differential  -     Comprehensive Metabolic Panel    5. Other hyperlipidemia  -     Lipid Panel; Future  -     Lipid Panel    6. Neck mass    7. Class 1 obesity due to excess calories with serious comorbidity and body mass index (BMI) of 34.0 to 34.9 in adult    Other orders  -      Fluzone >6 Months ([id number])             Follow Up   No follow-ups on file.  Patient was given instructions and counseling regarding his condition or for health maintenance advice. Please see specific information pulled into the AVS if appropriate.       Transcribed from ambient dictation for Susanne Carey MD by Salome Teran.  10/20/23   10:24 EDT    Patient or patient representative verbalized consent to the visit recording.  I have personally performed the services described in this document as transcribed by the above individual, and it is both accurate and complete.

## 2023-10-20 NOTE — PROGRESS NOTES
Venipuncture performed on Left Arm by Re Fernandez MA  with good hemostasis. Patient tolerated well. 10/20/23

## 2023-10-21 NOTE — PROGRESS NOTES
Vitamin D is decreased so take of 1000 units daily over-the-counter.  Bad cholesterol is 127 and goal is below 100 can you do more with diet and exercise or do you want to try a statin please let us know about the above and if you have any other questions or concerns

## 2023-10-22 ENCOUNTER — TELEPHONE (OUTPATIENT)
Dept: FAMILY MEDICINE CLINIC | Facility: CLINIC | Age: 38
End: 2023-10-22
Payer: COMMERCIAL

## 2023-10-22 NOTE — TELEPHONE ENCOUNTER
Hub to relay  ----- Message from Susanne Carey MD sent at 10/21/2023 10:41 AM EDT -----  Vitamin D is decreased so take of 1000 units daily over-the-counter.  Bad cholesterol is 127 and goal is below 100 can you do more with diet and exercise or do you want to try a statin please let us know about the above and if you have any other questions or concerns

## 2024-02-19 DIAGNOSIS — M54.50 ACUTE MIDLINE LOW BACK PAIN WITHOUT SCIATICA: ICD-10-CM

## 2024-02-19 RX ORDER — CYCLOBENZAPRINE HCL 10 MG
10 TABLET ORAL 3 TIMES DAILY PRN
Qty: 15 TABLET | Refills: 0 | OUTPATIENT
Start: 2024-02-19

## 2024-02-19 NOTE — TELEPHONE ENCOUNTER
Rx Refill Note  Requested Prescriptions     Pending Prescriptions Disp Refills   • cyclobenzaprine (FLEXERIL) 10 MG tablet 15 tablet 0     Sig: Take 1 tablet by mouth 3 (Three) Times a Day As Needed for Muscle Spasms.      Last office visit with prescribing clinician: 5/22/2023      Next office visit with prescribing clinician: Visit date not found   3}  Skylar Shrestha  02/19/24, 11:46 EST

## 2024-06-01 ENCOUNTER — HOSPITAL ENCOUNTER (EMERGENCY)
Facility: HOSPITAL | Age: 39
Discharge: HOME | End: 2024-06-01
Payer: COMMERCIAL

## 2024-06-01 ENCOUNTER — APPOINTMENT (OUTPATIENT)
Dept: GENERAL RADIOLOGY | Facility: HOSPITAL | Age: 39
End: 2024-06-01
Payer: COMMERCIAL

## 2024-06-01 VITALS
RESPIRATION RATE: 18 BRPM | TEMPERATURE: 98.5 F | BODY MASS INDEX: 37.26 KG/M2 | SYSTOLIC BLOOD PRESSURE: 135 MMHG | OXYGEN SATURATION: 95 % | DIASTOLIC BLOOD PRESSURE: 97 MMHG | HEIGHT: 69 IN | WEIGHT: 251.54 LBS | HEART RATE: 64 BPM

## 2024-06-01 DIAGNOSIS — S93.401A SPRAIN OF RIGHT ANKLE, UNSPECIFIED LIGAMENT, INITIAL ENCOUNTER: Primary | ICD-10-CM

## 2024-06-01 PROCEDURE — 99283 EMERGENCY DEPT VISIT LOW MDM: CPT

## 2024-06-01 PROCEDURE — 73610 X-RAY EXAM OF ANKLE: CPT

## 2024-06-01 RX ORDER — ACETAMINOPHEN 500 MG
1000 TABLET ORAL ONCE
Status: COMPLETED | OUTPATIENT
Start: 2024-06-01 | End: 2024-06-01

## 2024-06-01 RX ADMIN — ACETAMINOPHEN 1000 MG: 500 TABLET, FILM COATED ORAL at 12:31

## 2024-06-01 NOTE — DISCHARGE INSTRUCTIONS
Rest ice and elevate for the next 3 days.  Limited amount of movement over the next 3 days.  If pain persist past 3 to 5 days please consider calling orthopedic MD for further evaluation and care and continue to rest ice and elevate.

## 2024-06-01 NOTE — ED PROVIDER NOTES
"Subjective   History of Present Illness  38-year-old  male in no acute or apparent distress presents to the emergency room with complaint of right ankle pain and swelling status post twisting it just prior to arrival.  Patient states that he is able to walk gently on tiptoes but states that he has had a history of a left ankle fracture in the past and \"just wants to make sure it is not broke\".  Patient reports that he has not taken any medicines for the pain.  He reports mild pain.      Review of Systems   Musculoskeletal:         Right ankle pain and mild swelling.       History reviewed. No pertinent past medical history.    Allergies   Allergen Reactions    Lisinopril Cough     cough       Past Surgical History:   Procedure Laterality Date    INGUINAL HERNIA REPAIR Right     LUMBAR LAMINECTOMY Right 9/19/2019    Procedure: RIGHT L5/S1 LAMINOTOMY FORAMINOTOMY AND DISCECTOMY W METRX;  Surgeon: Myke Ramos MD;  Location: [facility] OR;  Service: Neurosurgery    ORIF ANKLE FRACTURE Left     does have hardware       History reviewed. No pertinent family history.    Social History     Socioeconomic History    Marital status: Single   Tobacco Use    Smoking status: Never    Smokeless tobacco: Never   Vaping Use    Vaping status: Never Used   Substance and Sexual Activity    Alcohol use: Yes     Alcohol/week: 6.0 standard drinks of alcohol     Types: 6 Cans of beer per week    Drug use: No    Sexual activity: Defer           Objective   Physical Exam  Constitutional:       Appearance: Normal appearance.   HENT:      Head: Normocephalic and atraumatic.   Musculoskeletal:      Right ankle: Swelling present. No deformity, ecchymosis or lacerations. Tenderness present over the lateral malleolus. Decreased range of motion. Normal pulse.      Right Achilles Tendon: Normal.        Legs:    Skin:     General: Skin is warm and dry.      Findings: No bruising or erythema.   Neurological:      Mental Status: He " is alert.         Procedures           ED Course      XR Ankle 3+ View Right    Result Date: 6/1/2024  Impression: 1.Lateral soft tissue edema. 2.No definite acute displaced fracture. Electronically Signed: Abundio Paulsonnereida  6/1/2024 12:36 PM EDT  Workstation ID: [id number]                             Medications   acetaminophen (TYLENOL) tablet 1,000 mg (1,000 mg Oral Given 6/1/24 1231)              Labs Reviewed - No data to display   Medical Decision Making  Examined right ankle and ice pack ordered for mild to moderate edema noted.  Also 1 g of Tylenol was ordered to help with patient's pain.  3 view x-ray was ordered and obtained and was negative for acute    Problems Addressed:  Sprain of right ankle, unspecified ligament, initial encounter: complicated acute illness or injury     Details: X-ray reveals no acute osseous abnormality.    Amount and/or Complexity of Data Reviewed  Radiology: ordered.     Details: XR Ankle 3+ View Right    Result Date: 6/1/2024  Impression: 1.Lateral soft tissue edema. 2.No definite acute displaced fracture. Electronically Signed: Abundio Oj  6/1/2024 12:36 PM EDT  Workstation ID: [id number]       Risk  OTC drugs.  Risk Details: Ordered and placed patient in Ace wrap and cam walker to help with pain and stabilization of his right ankle sprain.  RICE therapy thoroughly discussed with patient and highly encourage patient to follow-up with orthopedic specialist if pain persist past 3 to 5 days.  Patient verbalized understanding.  Dr. Montiel contact information was provided in discharge paperwork.        Final diagnoses:   Sprain of right ankle, unspecified ligament, initial encounter       ED Disposition  ED Disposition       ED Disposition   Discharge    Condition   Stable    Comment   --               Susanne Carey MD  [address] [place] [address]  [phone]    Schedule an appointment as soon as possible for a visit in 1 week  As needed, If symptoms worsen    Maciej Montiel  MD  [address] [facility] [place] [address]  [phone]    Schedule an appointment as soon as possible for a visit in 3 days  As needed, If symptoms worsen         Medication List        Changed      cyclobenzaprine 10 MG tablet  Commonly known as: FLEXERIL  Take 1 tablet by mouth 3 (Three) Times a Day As Needed for Muscle Spasms.  What changed: when to take this                 Francisca Zhao, ANGIE  06/01/24 1221

## 2024-06-17 ENCOUNTER — TELEPHONE (OUTPATIENT)
Dept: FAMILY MEDICINE CLINIC | Facility: CLINIC | Age: 39
End: 2024-06-17
Payer: COMMERCIAL

## 2024-06-17 NOTE — TELEPHONE ENCOUNTER
Caller: Bo Kay    Relationship: Self    Best call back number:     [phone] (Mobile)       What form or medical record are you requesting: RELEASE TO GO BACK TO WORK   WOULD LIKE TO GO BACK TOMORROW     Who is requesting this form or medical record from you: EMPLOYER     How would you like to receive the form or medical records (pick-up, mail, fax):      Timeframe paperwork needed: TODAY       Additional notes:   PLEASE CALL AND ADVISE

## 2024-06-17 NOTE — TELEPHONE ENCOUNTER
Patient is going to see if he can get note from the ER.  States he feels fine.  Went to [place] last week with it.

## 2024-06-17 NOTE — TELEPHONE ENCOUNTER
Mr. Clarke can either get the return to work note from the ER nurse practitioner or we would need to see him before we would allow him to return to work.  Did he see the orthopedist as suggested by the nurse practitioner from the ER?

## 2024-06-18 NOTE — PATIENT INSTRUCTIONS
Health Maintenance Due   Topic Date Due    TDAP/TD VACCINES (2 - Tdap) 07/20/2008    COVID-19 Vaccine (3 - 2023-24 season) 09/01/2023    ANNUAL PHYSICAL  01/20/2024    You are due for adacel Tdap vaccination. (provides protection against tetanus, diptheria and whooping cough) Please  get the immunization at your local pharmacy at your earliest convenience. This immunization will next be due in 10 years. Please click on the link for more information about this vaccine.    [facility] Tdap Vaccine Information    Check blood pressure cuff for accuracy and send 10 blood pressures over 2 weeks.  Watch sodium, alcohol and weight     12 hour fast for labs

## 2024-06-19 PROBLEM — S93.431A SPRAIN OF TIBIOFIBULAR LIGAMENT OF RIGHT ANKLE: Status: ACTIVE | Noted: 2024-06-19

## 2024-06-19 NOTE — ASSESSMENT & PLAN NOTE
Patient's (There is no height or weight on file to calculate BMI.) indicates that they are morbidly/severely obese (BMI > 40 or > 35 with obesity - related health condition) with health conditions that include hypertension and dyslipidemias . Weight is unchanged. BMI  is above average; BMI management plan is completed. We discussed portion control and increasing exercise.

## 2024-06-20 ENCOUNTER — OFFICE VISIT (OUTPATIENT)
Dept: FAMILY MEDICINE CLINIC | Facility: CLINIC | Age: 39
End: 2024-06-20
Payer: COMMERCIAL

## 2024-06-20 VITALS
WEIGHT: 253.2 LBS | DIASTOLIC BLOOD PRESSURE: 113 MMHG | HEIGHT: 69 IN | TEMPERATURE: 97 F | SYSTOLIC BLOOD PRESSURE: 162 MMHG | HEART RATE: 85 BPM | BODY MASS INDEX: 37.5 KG/M2 | OXYGEN SATURATION: 93 %

## 2024-06-20 DIAGNOSIS — Z80.0 FAMILY HISTORY OF COLON CANCER: ICD-10-CM

## 2024-06-20 DIAGNOSIS — S93.431D SPRAIN OF TIBIOFIBULAR LIGAMENT OF RIGHT ANKLE, SUBSEQUENT ENCOUNTER: ICD-10-CM

## 2024-06-20 DIAGNOSIS — F17.200 TOBACCO USE DISORDER: ICD-10-CM

## 2024-06-20 DIAGNOSIS — Z00.01 ENCOUNTER FOR ANNUAL GENERAL MEDICAL EXAMINATION WITH ABNORMAL FINDINGS IN ADULT: Primary | ICD-10-CM

## 2024-06-20 DIAGNOSIS — R06.83 SNORING: ICD-10-CM

## 2024-06-20 DIAGNOSIS — E66.09 CLASS 2 OBESITY DUE TO EXCESS CALORIES WITHOUT SERIOUS COMORBIDITY WITH BODY MASS INDEX (BMI) OF 37.0 TO 37.9 IN ADULT: ICD-10-CM

## 2024-06-20 DIAGNOSIS — I10 PRIMARY HYPERTENSION: ICD-10-CM

## 2024-06-20 DIAGNOSIS — E55.9 VITAMIN D DEFICIENCY: ICD-10-CM

## 2024-06-20 DIAGNOSIS — E78.49 OTHER HYPERLIPIDEMIA: ICD-10-CM

## 2024-06-20 PROCEDURE — 99395 PREV VISIT EST AGE 18-39: CPT | Performed by: PREVENTIVE MEDICINE

## 2024-06-20 PROCEDURE — 99213 OFFICE O/P EST LOW 20 MIN: CPT | Performed by: PREVENTIVE MEDICINE

## 2024-06-20 NOTE — LETTER
June 20, 2024     Patient: Bo Kay   YOB: 1985   Date of Visit: 6/20/2024       To Whom It May Concern:    It is my medical opinion that Bo Kay may return to work 6/21/24         Sincerely,        Susanne Carey MD    CC: No Recipients  
negative

## 2024-06-21 NOTE — PROGRESS NOTES
Subjective   Bo Kay is a 38 y.o. male presents for   Chief Complaint   Patient presents with    Annual Exam    Ankle Pain     Sprained 3 weeks ago.  Wanting clearance for work.    Hypertension     States he has been very active this morning.  Does not take meds.  Had previously tried lisinopril.         Health Maintenance Due   Topic Date Due    TDAP/TD VACCINES (2 - Tdap) 07/20/2008    COVID-19 Vaccine (3 - 2023-24 season) 09/01/2023    ANNUAL PHYSICAL  01/20/2024     Patient was advised to wear sunscreen and a seatbelt.  Ankle Pain     Hypertension       History of Present Illness  The patient is a 38-year-old male who is here today for his annual wellness exam, sprain of the talofibular ligament of the right ankle, subsequent encounter, vitamin D deficiency, tobacco use, snoring, primary hypertension, hyperlipidemia, class 2 severe obesity due to excess calories and a family history of colon cancer.    The patient sustained an ankle sprain on 06/01/2023, which necessitated a three-day period of rest and the application of RICE therapy. 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 He reports a 90 percent improvement in his condition, rating it at 1 or 2 on a scale of 10. He denies any alterations in vision or hearing since his last visit. He denies the presence of a sore throat, hemoptysis, or sputum. He also denies experiencing headaches or wheezing. He has a history of ankle sprains. He denies experiencing chest pressure or palpitations. His swallowing and indigestion are well-managed. His bowel movements are regular, and he denies dysuria or hematuria. He denies any erectile dysfunction or ejaculation issues. He also denies systemic symptoms such as fever, chills, night sweats, or weight loss. He recalls a fracture in his ankle 12 years ago. Initially, he experienced tenderness and bruising in the soles of his foot, which has since resolved.    The patient's blood  "pressure was slightly elevated during this visit. He attributes this to increased physical activity this morning, including ascending six flights of stairs three times and climbing approximately 80 feet of scaffolding a few times. He experienced coughing as a side effect of lisinopril, and experienced lightheadedness and dizziness upon standing.   He does not smoke.    Vitals:    06/20/24 0952 06/20/24 0953   BP: (!) 173/128 (!) 162/113   BP Location: Left arm Right arm   Patient Position: Sitting Sitting   Cuff Size: Adult Adult   Pulse: 82 85   Temp: 97 °F (36.1 °C)    TempSrc: Temporal    SpO2: 93%    Weight: 115 kg (253 lb 3.2 oz)    Height: 175.3 cm (69\")      Body mass index is 37.39 kg/m².    Current Outpatient Medications on File Prior to Visit   Medication Sig Dispense Refill    cyclobenzaprine (FLEXERIL) 10 MG tablet Take 1 tablet by mouth 3 (Three) Times a Day As Needed for Muscle Spasms. (Patient taking differently: Take 1 tablet by mouth As Needed for Muscle Spasms.) 15 tablet 0    fluticasone (FLONASE) 50 MCG/ACT nasal spray 2 sprays into the nostril(s) as directed by provider Daily. 1 mL 3    Triamcinolone Acetonide (NASACORT) 55 MCG/ACT nasal inhaler USE 2 SPRAYS IN EACH NOSTRIL IN THE MORNING AS NEEDED       No current facility-administered medications on file prior to visit.       The following portions of the patient's history were reviewed and updated as appropriate: allergies, current medications, past family history, past medical history, past social history, past surgical history, and problem list.    Review of Systems   Musculoskeletal:  Positive for arthralgias and joint swelling.       Objective   Physical Exam  Vitals reviewed.   Constitutional:       General: He is not in acute distress.     Appearance: He is well-developed. He is obese. He is not ill-appearing or toxic-appearing.   HENT:      Head: Normocephalic and atraumatic.      Right Ear: Tympanic membrane, ear canal and external " ear normal.      Left Ear: Tympanic membrane, ear canal and external ear normal.      Nose: Nose normal.      Mouth/Throat:      Mouth: Mucous membranes are moist.      Pharynx: No posterior oropharyngeal erythema.   Eyes:      Extraocular Movements: Extraocular movements intact.      Conjunctiva/sclera: Conjunctivae normal.      Pupils: Pupils are equal, round, and reactive to light.   Cardiovascular:      Rate and Rhythm: Normal rate and regular rhythm.      Heart sounds: Murmur heard.      Comments: Faint systolic murmur will auscultate next visit and order echo if persists  Pulmonary:      Effort: Pulmonary effort is normal.      Comments: Breath sounds decreased bilaterally  Abdominal:      General: Bowel sounds are normal. There is no distension.      Palpations: Abdomen is soft. There is no mass.      Tenderness: There is no abdominal tenderness.   Musculoskeletal:         General: Swelling and tenderness present. Normal range of motion.      Cervical back: Neck supple.      Comments: Mild tenderness over the deltoid ligament and the talofibular ligament no palpable discomfort into the foot motor neurovascularly the foot is intact ankle is stable dorsi plantar inversion and eversion is strong against resistance patient can toe walk and heel walk there is some residual swelling left but patient can jump up and down on the right foot equals the left foot.   Skin:     General: Skin is warm.   Neurological:      General: No focal deficit present.      Mental Status: He is alert and oriented to person, place, and time.   Psychiatric:         Mood and Affect: Mood normal.         Behavior: Behavior normal.       Physical Exam  Throat appears normal. Tonsils are enlarged.  Carotid arteries appear normal.  Lungs sound normal.    PHQ-9 Total Score: 0  Results           Assessment & Plan   Diagnoses and all orders for this visit:    1. Encounter for annual general medical examination with abnormal findings in adult  (Primary)    2. Sprain of tibiofibular ligament of right ankle, subsequent encounter    3. Vitamin D deficiency  -     Vitamin D,25-Hydroxy; Future    4. Tobacco use disorder    5. Snoring    6. Primary hypertension  -     CBC Auto Differential; Future  -     Comprehensive Metabolic Panel; Future    7. Other hyperlipidemia  -     Lipid Panel; Future    8. Class 2 obesity due to excess calories without serious comorbidity with body mass index (BMI) of 37.0 to 37.9 in adult  Assessment & Plan:  Patient's (There is no height or weight on file to calculate BMI.) indicates that they are morbidly/severely obese (BMI > 40 or > 35 with obesity - related health condition) with health conditions that include hypertension and dyslipidemias . Weight is unchanged. BMI  is above average; BMI management plan is completed. We discussed portion control and increasing exercise.       9. Family history of colon cancer      Assessment & Plan  1. Annual wellness exam.  The patient is advised to persist with ankle strengthening exercises. A 12-hour fasting period will be conducted for laboratory tests.    2. Hypertension.  The patient has been instructed to monitor his blood pressure daily for the next 2 weeks.    Follow-up  A follow-up visit is scheduled for 6 months from now.    Patient Instructions     Health Maintenance Due   Topic Date Due    TDAP/TD VACCINES (2 - Tdap) 07/20/2008    COVID-19 Vaccine (3 - 2023-24 season) 09/01/2023    ANNUAL PHYSICAL  01/20/2024    You are due for adacel Tdap vaccination. (provides protection against tetanus, diptheria and whooping cough) Please  get the immunization at your local pharmacy at your earliest convenience. This immunization will next be due in 10 years. Please click on the link for more information about this vaccine.    [facility] Tdap Vaccine Information    Check blood pressure cuff for accuracy and send 10 blood pressures over 2 weeks.  Watch sodium, alcohol and weight     12 hour fast for  labs         Patient or patient representative verbalized consent for the use of Ambient Listening during the visit with  Susanne Carey MD for chart documentation. 6/21/2024  07:58 EDT

## 2024-06-28 ENCOUNTER — LAB (OUTPATIENT)
Dept: FAMILY MEDICINE CLINIC | Facility: CLINIC | Age: 39
End: 2024-06-28
Payer: COMMERCIAL

## 2024-06-28 DIAGNOSIS — E78.49 OTHER HYPERLIPIDEMIA: ICD-10-CM

## 2024-06-28 DIAGNOSIS — I10 PRIMARY HYPERTENSION: ICD-10-CM

## 2024-06-28 DIAGNOSIS — E55.9 VITAMIN D DEFICIENCY: ICD-10-CM

## 2024-06-28 LAB
25(OH)D3 SERPL-MCNC: 29.5 NG/ML (ref 30–100)
ALBUMIN SERPL-MCNC: 4.3 G/DL (ref 3.5–5.2)
ALBUMIN/GLOB SERPL: 1.8 G/DL
ALP SERPL-CCNC: 59 U/L (ref 39–117)
ALT SERPL W P-5'-P-CCNC: 88 U/L (ref 1–41)
ANION GAP SERPL CALCULATED.3IONS-SCNC: 10.6 MMOL/L (ref 5–15)
AST SERPL-CCNC: 50 U/L (ref 1–40)
BASOPHILS # BLD AUTO: 0.04 10*3/MM3 (ref 0–0.2)
BASOPHILS NFR BLD AUTO: 0.7 % (ref 0–1.5)
BILIRUB SERPL-MCNC: 0.4 MG/DL (ref 0–1.2)
BUN SERPL-MCNC: 13 MG/DL (ref 6–20)
BUN/CREAT SERPL: 15.7 (ref 7–25)
CALCIUM SPEC-SCNC: 9.3 MG/DL (ref 8.6–10.5)
CHLORIDE SERPL-SCNC: 106 MMOL/L (ref 98–107)
CHOLEST SERPL-MCNC: 184 MG/DL (ref 0–200)
CO2 SERPL-SCNC: 26.4 MMOL/L (ref 22–29)
CREAT SERPL-MCNC: 0.83 MG/DL (ref 0.76–1.27)
DEPRECATED RDW RBC AUTO: 40.3 FL (ref 37–54)
EGFRCR SERPLBLD CKD-EPI 2021: 115.6 ML/MIN/1.73
EOSINOPHIL # BLD AUTO: 0.1 10*3/MM3 (ref 0–0.4)
EOSINOPHIL NFR BLD AUTO: 1.8 % (ref 0.3–6.2)
ERYTHROCYTE [DISTWIDTH] IN BLOOD BY AUTOMATED COUNT: 12.5 % (ref 12.3–15.4)
GLOBULIN UR ELPH-MCNC: 2.4 GM/DL
GLUCOSE SERPL-MCNC: 98 MG/DL (ref 65–99)
HCT VFR BLD AUTO: 45.4 % (ref 37.5–51)
HDLC SERPL-MCNC: 35 MG/DL (ref 40–60)
HGB BLD-MCNC: 15.4 G/DL (ref 13–17.7)
IMM GRANULOCYTES # BLD AUTO: 0.02 10*3/MM3 (ref 0–0.05)
IMM GRANULOCYTES NFR BLD AUTO: 0.4 % (ref 0–0.5)
LDLC SERPL CALC-MCNC: 117 MG/DL (ref 0–100)
LDLC/HDLC SERPL: 3.22 {RATIO}
LYMPHOCYTES # BLD AUTO: 1.64 10*3/MM3 (ref 0.7–3.1)
LYMPHOCYTES NFR BLD AUTO: 29.1 % (ref 19.6–45.3)
MCH RBC QN AUTO: 30.3 PG (ref 26.6–33)
MCHC RBC AUTO-ENTMCNC: 33.9 G/DL (ref 31.5–35.7)
MCV RBC AUTO: 89.2 FL (ref 79–97)
MONOCYTES # BLD AUTO: 0.44 10*3/MM3 (ref 0.1–0.9)
MONOCYTES NFR BLD AUTO: 7.8 % (ref 5–12)
NEUTROPHILS NFR BLD AUTO: 3.39 10*3/MM3 (ref 1.7–7)
NEUTROPHILS NFR BLD AUTO: 60.2 % (ref 42.7–76)
NRBC BLD AUTO-RTO: 0 /100 WBC (ref 0–0.2)
PLATELET # BLD AUTO: 202 10*3/MM3 (ref 140–450)
PMV BLD AUTO: 11.4 FL (ref 6–12)
POTASSIUM SERPL-SCNC: 4.3 MMOL/L (ref 3.5–5.2)
PROT SERPL-MCNC: 6.7 G/DL (ref 6–8.5)
RBC # BLD AUTO: 5.09 10*6/MM3 (ref 4.14–5.8)
SODIUM SERPL-SCNC: 143 MMOL/L (ref 136–145)
TRIGL SERPL-MCNC: 181 MG/DL (ref 0–150)
VLDLC SERPL-MCNC: 32 MG/DL (ref 5–40)
WBC NRBC COR # BLD AUTO: 5.63 10*3/MM3 (ref 3.4–10.8)

## 2024-06-28 PROCEDURE — 36415 COLL VENOUS BLD VENIPUNCTURE: CPT

## 2024-06-28 PROCEDURE — 82306 VITAMIN D 25 HYDROXY: CPT | Performed by: PREVENTIVE MEDICINE

## 2024-06-28 PROCEDURE — 80061 LIPID PANEL: CPT | Performed by: PREVENTIVE MEDICINE

## 2024-06-28 PROCEDURE — 85025 COMPLETE CBC W/AUTO DIFF WBC: CPT | Performed by: PREVENTIVE MEDICINE

## 2024-06-28 PROCEDURE — 80053 COMPREHEN METABOLIC PANEL: CPT | Performed by: PREVENTIVE MEDICINE

## 2024-06-30 DIAGNOSIS — R74.01 ELEVATED TRANSAMINASE LEVEL: Primary | ICD-10-CM

## 2024-06-30 NOTE — PROGRESS NOTES
Liver functions have increased so if you are taking any ibuprofen Aleve Motrin please stop.  Please make sure that you have decreased your Flexeril as well and if you drink alcohol you need to limit it to 1 serving a day.  We need to repeat a fasting liver function test in 4 weeks.  Vitamin D is slightly low so increase to 1000 units a day or 2/week finally bad cholesterol is 117 and goal is below 100 however we do not wish to start any medications 2 weeks.  There define your liver problems.  So watch your saturated fats and increase your walking in the meantime call if any other questions or concerns

## 2024-09-24 ENCOUNTER — TELEPHONE (OUTPATIENT)
Dept: FAMILY MEDICINE CLINIC | Facility: CLINIC | Age: 39
End: 2024-09-24
Payer: COMMERCIAL

## (undated) DEVICE — DECANTER: Brand: UNBRANDED

## (undated) DEVICE — ORG INST STRIP/TS ADHS 2X10IN YEL STRL

## (undated) DEVICE — DRSNG WND BORDR/ADHS NONADHR/GZ LF 4X4IN STRL

## (undated) DEVICE — SOL LACTATED RINGER 1000ML

## (undated) DEVICE — PK BASIC SPINE 50

## (undated) DEVICE — 6.0MM PRECISION ROUND

## (undated) DEVICE — SOL IRRIG H2O 1000ML STRL

## (undated) DEVICE — GLV SURG TRIUMPH ORTHO W/ALOE PF LTX 8 STRL

## (undated) DEVICE — NDL SPINE 20G 3 1/2 YEL STRL 1P/U

## (undated) DEVICE — Device

## (undated) DEVICE — PK PROC TURNOVER

## (undated) DEVICE — SUT VIC 2/0 CT1 CR8 18IN J839D

## (undated) DEVICE — SUT VIC 3/0 FS1 27IN J442H

## (undated) DEVICE — SKIN AFFIX SURG ADHESIVE 72/CS 0.55ML: Brand: MEDLINE

## (undated) DEVICE — SUT VIC 0 CT2 CR8 18IN DYED J727D

## (undated) DEVICE — INSTRUMENT 9562617 DISP 26MMX7CM RTRCT: Brand: METRX® SYSTEM